# Patient Record
Sex: MALE | Race: WHITE | Employment: FULL TIME | URBAN - METROPOLITAN AREA
[De-identification: names, ages, dates, MRNs, and addresses within clinical notes are randomized per-mention and may not be internally consistent; named-entity substitution may affect disease eponyms.]

---

## 2017-06-05 ENCOUNTER — TELEPHONE (OUTPATIENT)
Dept: INTERNAL MEDICINE | Facility: CLINIC | Age: 32
End: 2017-06-05

## 2017-06-05 NOTE — TELEPHONE ENCOUNTER
Panel Management Review      Patient has the following on his problem list:     Hypertension   Last three blood pressure readings:  BP Readings from Last 3 Encounters:   10/25/16 (!) 142/102   07/13/16 (!) 164/118   12/09/09 150/90     Blood pressure: FAILED    HTN Guidelines:  Age 18-59 BP range:  Less than 140/90  Age 60-85 with Diabetes:  Less than 140/90  Age 60-85 without Diabetes:  less than 150/90      Composite cancer screening  Chart review shows that this patient is due/due soon for the following None  Summary:    Patient is due/failing the following:   BP CHECK    Action needed:   Patient needs office visit for HTN.    Type of outreach:    Phone, left message for patient to call back.     Questions for provider review:    None                                                                                                                                    LEOBARDO FLORES CMA       Chart routed to Care Team .

## 2017-06-05 NOTE — LETTER
[facility]  [address], [address]  [place], [place]  [address]                                            TEL:[phone]  FAX:[phone]      Stephane Vergara  [address]  [facility] [address]      June 28, 2017    Dear Stephane,          At [facility], we care about your health and well-being. A review of your chart has indicated that you are due for a follow up visit regarding blood pressure. Please contact us at [phone] to schedule an appointment.     If you have already had one or all of the above screening tests at another facility, please call us to update your chart.        Sincerely,      Qian Ann C.N.P.

## 2017-06-12 DIAGNOSIS — I10 BENIGN ESSENTIAL HTN: ICD-10-CM

## 2017-06-13 RX ORDER — LISINOPRIL 20 MG/1
TABLET ORAL
Qty: 60 TABLET | Refills: 0 | Status: SHIPPED | OUTPATIENT
Start: 2017-06-13 | End: 2018-06-12

## 2017-06-13 NOTE — TELEPHONE ENCOUNTER
Lisinopril       Last Written Prescription Date: 10/25/16  Last Fill Quantity: 180, # refills: 1  Last Office Visit with G, P or [facility] prescribing provider: 10/25/16     Per 10/25 dict-Increase lisinopril to 40 mg, nurse only BP in 2-3 weeks.  Labs pending  F/u 3 months and prn    Potassium   Date Value Ref Range Status   10/25/2016 3.9 3.4 - 5.3 mmol/L Final     Creatinine   Date Value Ref Range Status   10/25/2016 0.80 0.66 - 1.25 mg/dL Final     BP Readings from Last 3 Encounters:   10/25/16 (!) 142/102   07/13/16 (!) 164/118   12/09/09 150/90

## 2018-06-04 ENCOUNTER — OFFICE VISIT (OUTPATIENT)
Dept: URGENT CARE | Facility: URGENT CARE | Age: 33
End: 2018-06-04
Payer: COMMERCIAL

## 2018-06-04 VITALS
TEMPERATURE: 99.3 F | HEART RATE: 82 BPM | DIASTOLIC BLOOD PRESSURE: 102 MMHG | OXYGEN SATURATION: 98 % | SYSTOLIC BLOOD PRESSURE: 169 MMHG

## 2018-06-04 DIAGNOSIS — I10 BENIGN ESSENTIAL HYPERTENSION: ICD-10-CM

## 2018-06-04 DIAGNOSIS — M65.4 RADIAL STYLOID TENOSYNOVITIS: Primary | ICD-10-CM

## 2018-06-04 PROCEDURE — 99213 OFFICE O/P EST LOW 20 MIN: CPT | Performed by: PHYSICIAN ASSISTANT

## 2018-06-04 RX ORDER — LISINOPRIL 40 MG/1
40 TABLET ORAL DAILY
Qty: 30 TABLET | Refills: 1 | Status: SHIPPED | OUTPATIENT
Start: 2018-06-04 | End: 2018-10-23

## 2018-06-04 RX ORDER — PREDNISONE 20 MG/1
40 TABLET ORAL DAILY
Qty: 10 TABLET | Refills: 0 | Status: SHIPPED | OUTPATIENT
Start: 2018-06-04 | End: 2018-06-09

## 2018-06-04 NOTE — MR AVS SNAPSHOT
After Visit Summary   6/4/2018    Stephane Vergara    MRN: [id number]           Patient Information     Date Of Birth          1985        Visit Information        Provider Department      6/4/2018 7:15 PM Harriet Foreman PA-C [facility] URG[facility]        Today's Diagnoses     Benign essential hypertension    -  1    Radial styloid tenosynovitis          Care Instructions      De Quervain Tenosynovitis    De Quervain tenosynovitis is inflammation of tendons and synovium on the thumb side of the wrist. Tendons are fibers that attach muscle to bone. Synovium is a slick membrane that helps tendons move. Movements done over and over can irritate and inflame these tissues. This can cause pain when you touch or grasp objects, turn or twist your wrist, or make a fist. You may also have pain and swelling near the base of the thumb or numbness along the back of your thumb and index finger. You may also feel the thumb catch or snap when you move it.  Treatment will depend on how bad the pain is. It can often be treated with medicines, injections, splinting, and home care. If your case is severe, you may be referred to a specialist to talk about surgery.  Home care  Your healthcare provider may prescribe medicines to relieve pain and reduce inflammation. A steroid medicine may be injected near the tendons. This reduces swelling. The healthcare provider may also suggest taking over-the-counter medicines like ibuprofen or naproxen. These help reduce inflammation. Take all medicines only as directed.  The following are general care guidelines:    Avoid repetitive movements of your wrist and thumb.    Note any activity that causes pain or swelling. If possible, avoid or limit that activity.    Put a cold pack on your thumb. You can make your own cold pack by wrapping a plastic bag of ice or bag of frozen vegetables in a thin towel. Hold this to your thumb for up to 20 minutes at a time. Don't put  ice directly on the skin.    Your healthcare provider may put a splint on the thumb to hold it still. Use the splint as you have been instructed. In some cases, you may need to use a splint 24 hours a day for 4 to 6 weeks. This will allow the wrist and thumb to heal.  Follow-up care  Follow up with your healthcare provider, or as advised. You may need more treatment if your injury is severe or if your symptoms don't get better. This additional treatment may include local injections, physical therapy, and surgery.  When to seek medical advice  Call your healthcare provider right away if any of these occur:    Increase in pain or swelling    If you have fever, chills, redness, warmth, or drainage    Symptoms get worse after taking medicine    Pain spreads farther down the thumb or into the forearm    Pain continues to get in the way of daily life  Date Last Reviewed: 1/18/2016 2000-2017 [employer]. [address] [place] [address]. All rights reserved. This information is not intended as a substitute for professional medical care. Always follow your healthcare professional's instructions.                Follow-ups after your visit        Your next 10 appointments already scheduled     Jun 12, 2018  5:00 PM CDT   Office Visit with JEAN MARIE Garcia CNP   [facility] ([facility]    [address] [place] [address], [address]  [facility] [address] [phone]           Bring a current list of meds and any records pertaining to this visit. For Physicals, please bring immunization records and any forms needing to be filled out. Please arrive 10 minutes early to complete paperwork.              Who to contact     If you have questions or need follow up information about today's clinic visit or your schedule please contact [facility] directly at [phone].  Normal or non-critical lab and imaging results will be communicated to you by  MyChart, letter or phone within 4 business days after the clinic has received the results. If you do not hear from us within 7 days, please contact the clinic through MyChart or phone. If you have a critical or abnormal lab result, we will notify you by phone as soon as possible.  Submit refill requests through [employer] or call your pharmacy and they will forward the refill request to us. Please allow 3 business days for your refill to be completed.          Additional Information About Your Visit        Care EveryWhere ID     This is your Care EveryWhere ID. This could be used by other organizations to access your [place] medical records  [id number]        Your Vitals Were     Pulse Temperature Pulse Oximetry             82 99.3  F (37.4  C) (Oral) 98%          Blood Pressure from Last 3 Encounters:   06/04/18 (!) 169/102   10/25/16 (!) 142/102   07/13/16 (!) 164/118    Weight from Last 3 Encounters:   10/25/16 198 lb 8 oz (90 kg)   07/13/16 203 lb 11.2 oz (92.4 kg)   12/09/09 216 lb (98 kg)              Today, you had the following     No orders found for display         Today's Medication Changes          These changes are accurate as of 6/4/18  8:18 PM.  If you have any questions, ask your nurse or doctor.               Start taking these medicines.        Dose/Directions    predniSONE 20 MG tablet   Commonly known as:  DELTASONE   Used for:  Radial styloid tenosynovitis        Dose:  40 mg   Take 2 tablets (40 mg) by mouth daily for 5 days   Quantity:  10 tablet   Refills:  0         These medicines have changed or have updated prescriptions.        Dose/Directions    * lisinopril 20 MG tablet   Commonly known as:  PRINIVIL/ZESTRIL   This may have changed:  Another medication with the same name was added. Make sure you understand how and when to take each.   Used for:  Benign essential HTN        TAKE 2 TABLETS (40 MG) BY MOUTH DAILY   Quantity:  60 tablet   Refills:  0       * lisinopril 40 MG tablet    Commonly known as:  PRINIVIL/ZESTRIL   This may have changed:  You were already taking a medication with the same name, and this prescription was added. Make sure you understand how and when to take each.   Used for:  Benign essential hypertension        Dose:  40 mg   Take 1 tablet (40 mg) by mouth daily   Quantity:  30 tablet   Refills:  1       * Notice:  This list has 2 medication(s) that are the same as other medications prescribed for you. Read the directions carefully, and ask your doctor or other care provider to review them with you.         Where to get your medicines      These medications were sent to [facility]/pharmacy #[id number] - [place], [place] - [address]  [address], [facility] [address]     Phone:  [phone]     lisinopril 40 MG tablet    predniSONE 20 MG tablet                Primary Care Provider    Davi Tesfaye MD       No address on file        Equal Access to Services     [facility] : Hadii aad ku hadasho Sowinnie, waaxda luqadaha, qaybta kaalmada adeshunyapaula, stewart rojas . So [facility] [phone].    ATENCIÓN: Si habla español, tiene a burris disposición servicios gratuitos de asistencia lingüística. Ll[facility] [phone].    We comply with applicable federal civil rights laws and [place] laws. We do not discriminate on the basis of race, color, national origin, age, disability, sex, sexual orientation, or gender identity.            Thank you!     Thank you for choosing [facility]  for your care. Our goal is always to provide you with excellent care. Hearing back from our patients is one way we can continue to improve our services. Please take a few minutes to complete the written survey that you may receive in the mail after your visit with us. Thank you!             Your Updated Medication List - Protect others around you: Learn how to safely use, store and throw away your medicines at www.disposemymeds.org.          This list  is accurate as of 6/4/18  8:18 PM.  Always use your most recent med list.                   Brand Name Dispense Instructions for use Diagnosis    * lisinopril 20 MG tablet    PRINIVIL/ZESTRIL    60 tablet    TAKE 2 TABLETS (40 MG) BY MOUTH DAILY    Benign essential HTN       * lisinopril 40 MG tablet    PRINIVIL/ZESTRIL    30 tablet    Take 1 tablet (40 mg) by mouth daily    Benign essential hypertension       predniSONE 20 MG tablet    DELTASONE    10 tablet    Take 2 tablets (40 mg) by mouth daily for 5 days    Radial styloid tenosynovitis       * Notice:  This list has 2 medication(s) that are the same as other medications prescribed for you. Read the directions carefully, and ask your doctor or other care provider to review them with you.

## 2018-06-05 ASSESSMENT — ENCOUNTER SYMPTOMS
LIGHT-HEADEDNESS: 0
HEADACHES: 0
DIZZINESS: 0
CHEST TIGHTNESS: 0
WEAKNESS: 0

## 2018-06-05 NOTE — PATIENT INSTRUCTIONS
De Quervain Tenosynovitis    De Quervain tenosynovitis is inflammation of tendons and synovium on the thumb side of the wrist. Tendons are fibers that attach muscle to bone. Synovium is a slick membrane that helps tendons move. Movements done over and over can irritate and inflame these tissues. This can cause pain when you touch or grasp objects, turn or twist your wrist, or make a fist. You may also have pain and swelling near the base of the thumb or numbness along the back of your thumb and index finger. You may also feel the thumb catch or snap when you move it.  Treatment will depend on how bad the pain is. It can often be treated with medicines, injections, splinting, and home care. If your case is severe, you may be referred to a specialist to talk about surgery.  Home care  Your healthcare provider may prescribe medicines to relieve pain and reduce inflammation. A steroid medicine may be injected near the tendons. This reduces swelling. The healthcare provider may also suggest taking over-the-counter medicines like ibuprofen or naproxen. These help reduce inflammation. Take all medicines only as directed.  The following are general care guidelines:    Avoid repetitive movements of your wrist and thumb.    Note any activity that causes pain or swelling. If possible, avoid or limit that activity.    Put a cold pack on your thumb. You can make your own cold pack by wrapping a plastic bag of ice or bag of frozen vegetables in a thin towel. Hold this to your thumb for up to 20 minutes at a time. Don't put ice directly on the skin.    Your healthcare provider may put a splint on the thumb to hold it still. Use the splint as you have been instructed. In some cases, you may need to use a splint 24 hours a day for 4 to 6 weeks. This will allow the wrist and thumb to heal.  Follow-up care  Follow up with your healthcare provider, or as advised. You may need more treatment if your injury is severe or if your  symptoms don't get better. This additional treatment may include local injections, physical therapy, and surgery.  When to seek medical advice  Call your healthcare provider right away if any of these occur:    Increase in pain or swelling    If you have fever, chills, redness, warmth, or drainage    Symptoms get worse after taking medicine    Pain spreads farther down the thumb or into the forearm    Pain continues to get in the way of daily life  Date Last Reviewed: 1/18/2016 2000-2017 [employer]. [address] [place] [address]. All rights reserved. This information is not intended as a substitute for professional medical care. Always follow your healthcare professional's instructions.

## 2018-06-05 NOTE — PROGRESS NOTES
SUBJECTIVE:   Stephane Vergara is a 33 year old male presenting with a chief complaint of right wrist pain  Chief Complaint   Patient presents with     Urgent Care     Musculoskeletal Problem     R wrist pain started today- painful to move wrist, no injury. Hx of gout       He is an established patient of [facility].    MS Pain  Onset of symptoms was 1 day(s) ago.  Location: right wrist  Context:       No injury/ No trauma      Mechanism: pain started insidiously and has been worsening throughout the day. He does a lot of repetitive wrist motion at work  Course of symptoms is worsening.    Severity moderate  Current and Associated symptoms: Pain  Denies  Bruising and numbness or tingling  Aggravating Factors: moving the wrist  Therapies to improve symptoms include: ibuprofen  This is the first time this type of problem has occurred for this patient. Reports history of gout in toes.    Patient also reports he has run out of his BP medication a week ago. He takes 40 mg lisinopril daily. Notes he has an appointment with his PCP coming up next week.    Review of Systems   Eyes: Negative for visual disturbance.   Respiratory: Negative for chest tightness.    Cardiovascular: Negative for chest pain and leg swelling.   Musculoskeletal:        Right wrist pain   Neurological: Negative for dizziness, weakness, light-headedness and headaches.       Past Medical History:   Diagnosis Date     Benign essential HTN      ETOH abuse      Family History   Problem Relation Age of Onset     DIABETES No family hx of      HEART DISEASE No family hx of      CANCER Paternal Grandfather      liver     Hypertension Mother      Dementia Paternal Grandmother      Current Outpatient Prescriptions   Medication Sig Dispense Refill     lisinopril (PRINIVIL/ZESTRIL) 20 MG tablet TAKE 2 TABLETS (40 MG) BY MOUTH DAILY (Patient not taking: Reported on 6/4/2018) 60 tablet 0     lisinopril (PRINIVIL/ZESTRIL) 40 MG tablet Take 1 tablet (40 mg) by mouth  daily 30 tablet 1     order for DME Equipment being ordered: thumb spica splint 1 Device 0     predniSONE (DELTASONE) 20 MG tablet Take 2 tablets (40 mg) by mouth daily for 5 days 10 tablet 0     Social History   Substance Use Topics     Smoking status: Current Every Day Smoker     Types: Other     Smokeless tobacco: Former User     Types: Chew     Quit date: 3/21/2007      Comment: early january quit, 2005     Alcohol use 0.0 oz/week     0 Standard drinks or equivalent per week      Comment: 1.75 vodlka every 3 days.       OBJECTIVE  BP (!) 169/102 (BP Location: Right arm, Patient Position: Chair, Cuff Size: Adult Regular)  Pulse 82  Temp 99.3  F (37.4  C) (Oral)  SpO2 98%    Physical Exam   General appearance: 33 yr old male in no acute distress  Head: atraumatic  Eye: conjunctiva normal  Right wrist exam: no gross deformities, swelling or ecchymosis noted, tender over radial brachialis tendon, positive finkelsteins maneuver. No erythema, no wound. ROM mildly limited due to pain.  Lungs clear bilaterally  Chest with normal heart tones S1 and S2    Labs:  No results found for this or any previous visit (from the past 24 hour(s)).        ASSESSMENT:      ICD-10-CM    1. Radial styloid tenosynovitis M65.4 predniSONE (DELTASONE) 20 MG tablet     order for DME   2. Benign essential hypertension I10 lisinopril (PRINIVIL/ZESTRIL) 40 MG tablet        Medical Decision Making:    Differential Diagnosis:  Right wrist strain, tendonitis    Serious Comorbid Conditions:  Adult:  None    PLAN:  Right wrist tendonitis: prednisone is prescribed. Provided with a thumb spica brace prior to departure. Rest, ice, antiinflammatories prn. Follow up if worsening symptoms. He agrees with the plan.  Hypertension: one refill for lisinopril provided. Strongly encouraged follow up with PCP for recheck. Patient agrees.    Followup:    If not improving or if condition worsens, follow up with your Primary Care Provider    Patient Instructions      De Quervain Tenosynovitis    De Quervain tenosynovitis is inflammation of tendons and synovium on the thumb side of the wrist. Tendons are fibers that attach muscle to bone. Synovium is a slick membrane that helps tendons move. Movements done over and over can irritate and inflame these tissues. This can cause pain when you touch or grasp objects, turn or twist your wrist, or make a fist. You may also have pain and swelling near the base of the thumb or numbness along the back of your thumb and index finger. You may also feel the thumb catch or snap when you move it.  Treatment will depend on how bad the pain is. It can often be treated with medicines, injections, splinting, and home care. If your case is severe, you may be referred to a specialist to talk about surgery.  Home care  Your healthcare provider may prescribe medicines to relieve pain and reduce inflammation. A steroid medicine may be injected near the tendons. This reduces swelling. The healthcare provider may also suggest taking over-the-counter medicines like ibuprofen or naproxen. These help reduce inflammation. Take all medicines only as directed.  The following are general care guidelines:    Avoid repetitive movements of your wrist and thumb.    Note any activity that causes pain or swelling. If possible, avoid or limit that activity.    Put a cold pack on your thumb. You can make your own cold pack by wrapping a plastic bag of ice or bag of frozen vegetables in a thin towel. Hold this to your thumb for up to 20 minutes at a time. Don't put ice directly on the skin.    Your healthcare provider may put a splint on the thumb to hold it still. Use the splint as you have been instructed. In some cases, you may need to use a splint 24 hours a day for 4 to 6 weeks. This will allow the wrist and thumb to heal.  Follow-up care  Follow up with your healthcare provider, or as advised. You may need more treatment if your injury is severe or if your  symptoms don't get better. This additional treatment may include local injections, physical therapy, and surgery.  When to seek medical advice  Call your healthcare provider right away if any of these occur:    Increase in pain or swelling    If you have fever, chills, redness, warmth, or drainage    Symptoms get worse after taking medicine    Pain spreads farther down the thumb or into the forearm    Pain continues to get in the way of daily life  Date Last Reviewed: 1/18/2016 2000-2017 [employer]. [address] [place] [address]. All rights reserved. This information is not intended as a substitute for professional medical care. Always follow your healthcare professional's instructions.

## 2018-06-12 ENCOUNTER — OFFICE VISIT (OUTPATIENT)
Dept: FAMILY MEDICINE | Facility: CLINIC | Age: 33
End: 2018-06-12
Payer: COMMERCIAL

## 2018-06-12 VITALS
HEART RATE: 94 BPM | WEIGHT: 196 LBS | BODY MASS INDEX: 29.03 KG/M2 | RESPIRATION RATE: 12 BRPM | HEIGHT: 69 IN | DIASTOLIC BLOOD PRESSURE: 100 MMHG | TEMPERATURE: 98.8 F | SYSTOLIC BLOOD PRESSURE: 148 MMHG

## 2018-06-12 DIAGNOSIS — I10 BENIGN ESSENTIAL HYPERTENSION: ICD-10-CM

## 2018-06-12 PROCEDURE — 99213 OFFICE O/P EST LOW 20 MIN: CPT | Performed by: NURSE PRACTITIONER

## 2018-06-12 RX ORDER — LISINOPRIL 40 MG/1
40 TABLET ORAL DAILY
Qty: 30 TABLET | Refills: 1 | Status: CANCELLED | OUTPATIENT
Start: 2018-06-12

## 2018-06-12 NOTE — MR AVS SNAPSHOT
"              After Visit Summary   6/12/2018    Stephane Vergara    MRN: [id number]           Patient Information     Date Of Birth          1985        Visit Information        Provider Department      6/12/2018 5:00 PM Nery Lyon APRN CNP [facility]        Today's Diagnoses     Benign essential hypertension          Care Instructions    Have blood pressure rechecked at a [facility] in 2 weeks.  Follow up in the clinic in 4 weeks.  Come to appointment fasting (nothing to eat/drink for 8-10 hours) for lab work.            Follow-ups after your visit        Follow-up notes from your care team     Return in about 4 weeks (around 7/10/2018) for blood pressure .      Who to contact     If you have questions or need follow up information about today's clinic visit or your schedule please contact [facility] directly at [phone].  Normal or non-critical lab and imaging results will be communicated to you by MyChart, letter or phone within 4 business days after the clinic has received the results. If you do not hear from us within 7 days, please contact the clinic through MyChart or phone. If you have a critical or abnormal lab result, we will notify you by phone as soon as possible.  Submit refill requests through [employer] or call your pharmacy and they will forward the refill request to us. Please allow 3 business days for your refill to be completed.          Additional Information About Your Visit        Care EveryWhere ID     This is your Care EveryWhere ID. This could be used by other organizations to access your [facility] medical records  [id number]        Your Vitals Were     Pulse Temperature Respirations Height BMI (Body Mass Index)       94 98.8  F (37.1  C) (Oral) 12 5' 9\" (1.753 m) 28.94 kg/m2        Blood Pressure from Last 3 Encounters:   06/12/18 (!) 148/100   06/04/18 (!) 169/102   10/25/16 (!) 142/102    Weight from Last 3 Encounters:   06/12/18 " 196 lb (88.9 kg)   10/25/16 198 lb 8 oz (90 kg)   07/13/16 203 lb 11.2 oz (92.4 kg)              Today, you had the following     No orders found for display       Primary Care Provider    Davi Tesfaye MD       No address on file        Equal Access to Services     [facility] : Hadii aad ku hadasho Soomaali, waaxda luqadaha, qaybta kaalmada adeegyada, waxay dorothyin haychristinen faye meronsathish rojas . So [facility] [phone].    ATENCIÓN: Si habla español, tiene a burris disposición servicios gratuitos de asistencia lingüística. Llame al [phone].    We comply with applicable federal civil rights laws and [place] laws. We do not discriminate on the basis of race, color, national origin, age, disability, sex, sexual orientation, or gender identity.            Thank you!     Thank you for choosing [facility]  for your care. Our goal is always to provide you with excellent care. Hearing back from our patients is one way we can continue to improve our services. Please take a few minutes to complete the written survey that you may receive in the mail after your visit with us. Thank you!             Your Updated Medication List - Protect others around you: Learn how to safely use, store and throw away your medicines at www.disposemymeds.org.          This list is accurate as of 6/12/18  5:15 PM.  Always use your most recent med list.                   Brand Name Dispense Instructions for use Diagnosis    lisinopril 40 MG tablet    PRINIVIL/ZESTRIL    30 tablet    Take 1 tablet (40 mg) by mouth daily    Benign essential hypertension

## 2018-06-12 NOTE — PATIENT INSTRUCTIONS
Have blood pressure rechecked at a [facility] in 2 weeks.  Follow up in the clinic in 4 weeks.  Come to appointment fasting (nothing to eat/drink for 8-10 hours) for lab work.

## 2018-06-12 NOTE — PROGRESS NOTES
SUBJECTIVE:   Stephane Vergara is a 33 year old male who presents to clinic today for the following health issues:      Hypertension     Hypertension:     Outpatient blood pressures:  Are not being checked    Dietary sodium intake::  Not monitoring salt intake    Diet:  Regular (no restrictions)  Taking medications regularly:  Yes  Medication side effects:  None  Additional concerns today:  No  History of Present Illness     Hypertension:     Outpatient blood pressures:  Are not being checked    Dietary sodium intake::  Not monitoring salt intake    Diet:  Regular (no restrictions)  Taking medications regularly:  Yes  Medication side effects:  None  Additional concerns today:  No    Has been on lisinopril for over a year.  Doesn't have a PCP.  Last refilled at .  Has been taking daily.  Work is physically demanding.  Lifts weights 2-3x/week.  Reports he feels anxious when he is at doctor appointments.  He does not check his BP at home, but his wife did recently buy him a cuff to use at home. He is a smoker; uses nicotine in an E-cig.  Not interested in quitting.     BP Readings from Last 6 Encounters:   06/12/18 (!) 148/100   06/04/18 (!) 169/102   10/25/16 (!) 142/102   07/13/16 (!) 164/118   12/09/09 150/90   05/21/07 142/100     Problem list and histories reviewed & adjusted, as indicated.  Additional history: as documented        Current Outpatient Prescriptions   Medication Sig Dispense Refill     lisinopril (PRINIVIL/ZESTRIL) 40 MG tablet Take 1 tablet (40 mg) by mouth daily 30 tablet 1     [DISCONTINUED] lisinopril (PRINIVIL/ZESTRIL) 20 MG tablet TAKE 2 TABLETS (40 MG) BY MOUTH DAILY (Patient not taking: Reported on 6/4/2018) 60 tablet 0     Allergies   Allergen Reactions     No Known Drug Allergies        ROS:  Constitutional, HEENT, cardiovascular, pulmonary, gi and gu systems are negative, except as otherwise noted.    OBJECTIVE:     BP (!) 148/100 (BP Location: Right arm, Patient Position: Sitting,  "Cuff Size: Adult Regular)  Pulse 94  Temp 98.8  F (37.1  C) (Oral)  Resp 12  Ht 5' 9\" (1.753 m)  Wt 196 lb (88.9 kg)  BMI 28.94 kg/m2  Body mass index is 28.94 kg/(m^2).  GENERAL: healthy, alert and no distress  EYES: Eyes grossly normal to inspection, PERRL and conjunctivae and sclerae normal  HENT: ear canals and TM's normal, nose and mouth without ulcers or lesions  NECK: no adenopathy, no asymmetry, masses, or scars and thyroid normal to palpation  RESP: lungs clear to auscultation - no rales, rhonchi or wheezes  CV: regular rate and rhythm, normal S1 S2, no S3 or S4, no murmur, click or rub  PSYCH: mentation appears normal, affect normal/bright    Diagnostic Test Results:  none     ASSESSMENT/PLAN:   1. Benign essential hypertension  Elevated today.  I would like him to have his BP rechecked in 2 weeks at a  pharmacy.  Can start checking BP at home as well.  If continuing to see elevated readings will need to consider adding additional medication.  Suspect white coat syndrome may be playing a role.  He has lisinopril at home and still has 1 refill.  Bring home cuff to next appointment.  Will do labs at next appointment; discussed importance of coming to appointment fasting.      F/u 1 month    JEAN MARIE Parrish [facility]  "

## 2018-10-11 ENCOUNTER — TELEPHONE (OUTPATIENT)
Dept: FAMILY MEDICINE | Facility: CLINIC | Age: 33
End: 2018-10-11

## 2018-10-11 NOTE — TELEPHONE ENCOUNTER
Panel Management Review      Patient has the following on his problem list:     Hypertension   Last three blood pressure readings:  BP Readings from Last 3 Encounters:   06/12/18 (!) 148/100   06/04/18 (!) 169/102   10/25/16 (!) 142/102     Blood pressure: FAILED    HTN Guidelines:  Age 18-59 BP range:  Less than 140/90  Age 60-85 with Diabetes:  Less than 140/90  Age 60-85 without Diabetes:  less than 150/90      Composite cancer screening  Chart review shows that this patient is due/due soon for the following None  Summary:    Patient is due/failing the following:   BP CHECK and FOLLOW UP    Action needed:   Patient needs office visit for hypertension follow up, blood pressure recheck.    Type of outreach:    Phone, spoke to patient.  scheduled appointment for 10/23/18 with Nery Lyon CNP     Questions for provider review:    None                                                                                                                                    Griselda Larson MA       Encounter closed

## 2018-10-23 ENCOUNTER — OFFICE VISIT (OUTPATIENT)
Dept: FAMILY MEDICINE | Facility: CLINIC | Age: 33
End: 2018-10-23
Payer: COMMERCIAL

## 2018-10-23 VITALS
RESPIRATION RATE: 16 BRPM | TEMPERATURE: 98.4 F | SYSTOLIC BLOOD PRESSURE: 164 MMHG | HEART RATE: 92 BPM | DIASTOLIC BLOOD PRESSURE: 116 MMHG | HEIGHT: 69 IN | OXYGEN SATURATION: 98 % | WEIGHT: 193.9 LBS | BODY MASS INDEX: 28.72 KG/M2

## 2018-10-23 DIAGNOSIS — R06.83 SNORING: Primary | ICD-10-CM

## 2018-10-23 DIAGNOSIS — Z13.6 CARDIOVASCULAR SCREENING; LDL GOAL LESS THAN 130: ICD-10-CM

## 2018-10-23 DIAGNOSIS — I10 BENIGN ESSENTIAL HYPERTENSION: ICD-10-CM

## 2018-10-23 DIAGNOSIS — R40.0 HAS DAYTIME DROWSINESS: ICD-10-CM

## 2018-10-23 PROCEDURE — 80048 BASIC METABOLIC PNL TOTAL CA: CPT | Performed by: NURSE PRACTITIONER

## 2018-10-23 PROCEDURE — 80061 LIPID PANEL: CPT | Performed by: NURSE PRACTITIONER

## 2018-10-23 PROCEDURE — 36415 COLL VENOUS BLD VENIPUNCTURE: CPT | Performed by: NURSE PRACTITIONER

## 2018-10-23 PROCEDURE — 99214 OFFICE O/P EST MOD 30 MIN: CPT | Performed by: NURSE PRACTITIONER

## 2018-10-23 RX ORDER — LISINOPRIL 40 MG/1
40 TABLET ORAL DAILY
Qty: 30 TABLET | Refills: 0 | Status: SHIPPED | OUTPATIENT
Start: 2018-10-23 | End: 2018-12-12

## 2018-10-23 NOTE — MR AVS SNAPSHOT
After Visit Summary   10/23/2018    Stephane Vergara    MRN: [id number]           Patient Information     Date Of Birth          1985        Visit Information        Provider Department      10/23/2018 4:40 PM Nery Lyon APRN CNP [facility]        Today's Diagnoses     Snoring    -  1    Benign essential hypertension        Has daytime drowsiness        CARDIOVASCULAR SCREENING; LDL GOAL LESS THAN 130          Care Instructions    Recheck BP in 2 weeks.            Follow-ups after your visit        Additional Services     SLEEP EVALUATION & MANAGEMENT REFERRAL - [facility]  [phone] (Age 18 and up)       Please be aware that coverage of these services is subject to the terms and limitations of your health insurance plan.  Call member services at your health plan with any benefit or coverage questions.      Please bring the following to your appointment:    >>   List of current medications   >>   This referral request   >>   Any documents/labs given to you for this referral                      Follow-up notes from your care team     Return in about 2 weeks (around 11/6/2018) for BP recheck .      Future tests that were ordered for you today     Open Future Orders        Priority Expected Expires Ordered    SLEEP EVALUATION & MANAGEMENT REFERRAL - [facility]  [phone] (Age 18 and up) Routine  10/23/2019 10/23/2018            Who to contact     If you have questions or need follow up information about today's clinic visit or your schedule please contact [facility] directly at [phone].  Normal or non-critical lab and imaging results will be communicated to you by MyChart, letter or phone within 4 business days after the clinic has received the results. If you do not hear from us within 7 days, please contact the clinic through MyChart or phone. If you have a critical or abnormal  "lab result, we will notify you by phone as soon as possible.  Submit refill requests through [employer] or call your pharmacy and they will forward the refill request to us. Please allow 3 business days for your refill to be completed.          Additional Information About Your Visit        MyChart Information     [employer] lets you send messages to your doctor, view your test results, renew your prescriptions, schedule appointments and more. To sign up, go to [email or web address] . Click on \"Log in\" on the left side of the screen, which will take you to the Welcome page. Then click on \"Sign up Now\" on the right side of the page.     You will be asked to enter the access code listed below, as well as some personal information. Please follow the directions to create your username and password.     Your access code is: [email or web address]  Expires: 2019  5:17 PM     Your access code will  in 90 days. If you need help or a new code, please call your [facility] clinic or [phone].        Care EveryWhere ID     This is your Care EveryWhere ID. This could be used by other organizations to access your [facility] medical records  [id number]        Your Vitals Were     Pulse Temperature Respirations Height Pulse Oximetry BMI (Body Mass Index)    92 98.4  F (36.9  C) (Oral) 16 5' 9\" (1.753 m) 98% 28.63 kg/m2       Blood Pressure from Last 3 Encounters:   10/23/18 (!) 164/116   18 (!) 148/100   18 (!) 169/102    Weight from Last 3 Encounters:   10/23/18 193 lb 14.4 oz (88 kg)   18 196 lb (88.9 kg)   10/25/16 198 lb 8 oz (90 kg)              We Performed the Following     Basic metabolic panel     Lipid panel reflex to direct LDL Non-fasting          Where to get your medicines      These medications were sent to [id number] - [place] - [address]    [place], [facility] [address]     Phone:  [phone]     lisinopril 40 MG tablet          Primary Care Provider    Davi SINGLETON" MD [place]       No address on file        Equal Access to Services     NURY COLLADO : Hadii sandra Marcos, sridevi franco, stewart ortega. So [facility] [phone].    ATENCIÓN: Si habla español, tiene a burris disposición servicios gratuitos de asistencia lingüística. Llame al [phone].    We comply with applicable federal civil rights laws and [place] laws. We do not discriminate on the basis of race, color, national origin, age, disability, sex, sexual orientation, or gender identity.            Thank you!     Thank you for choosing [facility]  for your care. Our goal is always to provide you with excellent care. Hearing back from our patients is one way we can continue to improve our services. Please take a few minutes to complete the written survey that you may receive in the mail after your visit with us. Thank you!             Your Updated Medication List - Protect others around you: Learn how to safely use, store and throw away your medicines at www.disposemymeds.org.          This list is accurate as of 10/23/18  5:17 PM.  Always use your most recent med list.                   Brand Name Dispense Instructions for use Diagnosis    lisinopril 40 MG tablet    PRINIVIL/ZESTRIL    30 tablet    Take 1 tablet (40 mg) by mouth daily    Benign essential hypertension

## 2018-10-23 NOTE — PROGRESS NOTES
"  SUBJECTIVE:   Stephane Vergara is a 33 year old male who presents to clinic today for the following health issues:    Hypertension Follow-up      Outpatient blood pressures are not being checked.    Low Salt Diet: Tries to stay away from salt.       Amount of exercise or physical activity: Just started lifting weights again.     Problems taking medications regularly: No    Medication side effects: none    Diet: regular (no restrictions)    Pt states regarding his hypertension that it feels as if his face feels hot, typically around the forehead.  Notices this when he gets tense.  He does note that he snores loudly and always feels tired, even after a full night of sleeping.  He has started to get back to lifting weights.  Work is physically demanding.  In the past drank a \"too much\".  Has cut back to 1 or 2 glasses of wine.  Reports he feels he could cut back more.  Doesn't feel he needs to drink.  Has been out of his medications for the past 1 week.          Problem list and histories reviewed & adjusted, as indicated.  Additional history: as documented    Current Outpatient Prescriptions   Medication Sig Dispense Refill     lisinopril (PRINIVIL/ZESTRIL) 40 MG tablet Take 1 tablet (40 mg) by mouth daily 30 tablet 1     Allergies   Allergen Reactions     No Known Drug Allergies        Reviewed and updated as needed this visit by clinical staff  Tobacco  Allergies  Meds  Med Hx  Surg Hx  Fam Hx  Soc Hx      Reviewed and updated as needed this visit by Provider         ROS:  Constitutional, HEENT, cardiovascular, pulmonary, gi and gu systems are negative, except as otherwise noted.    OBJECTIVE:     BP (!) 164/116 (BP Location: Right arm, Patient Position: Chair, Cuff Size: Adult Large)  Pulse 92  Temp 98.4  F (36.9  C) (Oral)  Resp 16  Ht 5' 9\" (1.753 m)  Wt 193 lb 14.4 oz (88 kg)  SpO2 98%  BMI 28.63 kg/m2  Body mass index is 28.63 kg/(m^2).  GENERAL: healthy, alert and no distress  NECK: no " adenopathy, no asymmetry, masses, or scars and thyroid normal to palpation  RESP: lungs clear to auscultation - no rales, rhonchi or wheezes  CV: regular rate and rhythm, normal S1 S2, no S3 or S4, no murmur, click or rub, no peripheral edema and peripheral pulses strong  PSYCH: mentation appears normal, affect normal/bright    Diagnostic Test Results:  none     ASSESSMENT/PLAN:   1. Benign essential hypertension  Out of medication for the past week.  BP recheck in 2 weeks. If controlled will send in additional refills and if still high will need to add an additional agent.    - lisinopril (PRINIVIL/ZESTRIL) 40 MG tablet; Take 1 tablet (40 mg) by mouth daily  Dispense: 30 tablet; Refill: 0  - Basic metabolic panel    2. Snoring  May be effecting BP.   - SLEEP EVALUATION & MANAGEMENT REFERRAL - [facility]  [phone] (Age 18 and up); Future    3. Has daytime drowsiness  - SLEEP EVALUATION & MANAGEMENT REFERRAL - [facility]  [phone] (Age 18 and up); Future    4. CARDIOVASCULAR SCREENING; LDL GOAL LESS THAN 130  - Lipid panel reflex to direct LDL Non-fasting    F/u 2 weeks for BP recheck    JEAN MARIE Parrish [facility]

## 2018-10-25 LAB
ANION GAP SERPL CALCULATED.3IONS-SCNC: 11 MMOL/L (ref 3–14)
BUN SERPL-MCNC: 15 MG/DL (ref 7–30)
CALCIUM SERPL-MCNC: 9.1 MG/DL (ref 8.5–10.1)
CHLORIDE SERPL-SCNC: 102 MMOL/L (ref 94–109)
CHOLEST SERPL-MCNC: 223 MG/DL
CO2 SERPL-SCNC: 23 MMOL/L (ref 20–32)
CREAT SERPL-MCNC: 0.88 MG/DL (ref 0.66–1.25)
GFR SERPL CREATININE-BSD FRML MDRD: >90 ML/MIN/1.7M2
GLUCOSE SERPL-MCNC: 81 MG/DL (ref 70–99)
HDLC SERPL-MCNC: 69 MG/DL
LDLC SERPL CALC-MCNC: 96 MG/DL
NONHDLC SERPL-MCNC: 154 MG/DL
POTASSIUM SERPL-SCNC: 4.2 MMOL/L (ref 3.4–5.3)
SODIUM SERPL-SCNC: 136 MMOL/L (ref 133–144)
TRIGL SERPL-MCNC: 292 MG/DL

## 2019-01-11 ENCOUNTER — TELEPHONE (OUTPATIENT)
Dept: FAMILY MEDICINE | Facility: CLINIC | Age: 34
End: 2019-01-11

## 2019-01-11 NOTE — LETTER
[address], [address]  [facility] [address] [phone]  February 7, 2019    Stephane Vergara  [address]  [facility] [address]      Dear Stephane,    I care about your health and have reviewed your health plan.  I have reviewed your medical conditions, medication list, and lab results and am making recommendations  based on this review, to better manage your health.    You are particularly in need of attention regarding:  -High Blood Pressure    I am recommending that you:  -schedule a FOLLOWUP OFFICE APPOINTMENT with me.       Here is a list of Health Maintenance topics that are due now or due soon:  Health Maintenance Due   Topic Date Due     HIV SCREEN (SYSTEM ASSIGNED)  01/07/2002     INFLUENZA VACCINE (1) 09/01/2018       Please call us at [phone] (or use [employer]) to address the above   recommendations.    Thank you for trusting [facility] and we appreciate the opportunity to serve you.  We look forward to supporting your healthcare needs in the future.    Healthy Regards,    JEAN MARIE Vegas CNP/[facility]

## 2019-01-11 NOTE — TELEPHONE ENCOUNTER
Panel Management Review      Patient has the following on his problem list:     Hypertension   Last three blood pressure readings:  BP Readings from Last 3 Encounters:   10/23/18 (!) 164/116   06/12/18 (!) 148/100   06/04/18 (!) 169/102     Blood pressure: FAILED    HTN Guidelines:  Age 18-59 BP range:  Less than 140/90  Age 60-85 with Diabetes:  Less than 140/90  Age 60-85 without Diabetes:  less than 150/90      Composite cancer screening  Chart review shows that this patient is due/due soon for the following None  Summary:    Patient is due/failing the following:   FOLLOW UP    Action needed:   Patient needs office visit for BLOOD PRESSURE.    Type of outreach:    Phone, left message for patient to call back.     Questions for provider review:    None                                                                                                                                    Lisa Magill, CMA       Chart routed to Care Team .

## 2019-03-05 ENCOUNTER — OFFICE VISIT (OUTPATIENT)
Dept: FAMILY MEDICINE | Facility: CLINIC | Age: 34
End: 2019-03-05
Payer: COMMERCIAL

## 2019-03-05 VITALS
BODY MASS INDEX: 28.88 KG/M2 | TEMPERATURE: 98.6 F | HEART RATE: 77 BPM | WEIGHT: 195 LBS | OXYGEN SATURATION: 97 % | SYSTOLIC BLOOD PRESSURE: 162 MMHG | HEIGHT: 69 IN | DIASTOLIC BLOOD PRESSURE: 108 MMHG

## 2019-03-05 DIAGNOSIS — I10 BENIGN ESSENTIAL HYPERTENSION: ICD-10-CM

## 2019-03-05 PROCEDURE — 99214 OFFICE O/P EST MOD 30 MIN: CPT | Performed by: NURSE PRACTITIONER

## 2019-03-05 RX ORDER — LISINOPRIL AND HYDROCHLOROTHIAZIDE 12.5; 2 MG/1; MG/1
2 TABLET ORAL DAILY
Qty: 60 TABLET | Refills: 0 | Status: CANCELLED | OUTPATIENT
Start: 2019-03-05

## 2019-03-05 RX ORDER — LISINOPRIL 40 MG/1
40 TABLET ORAL DAILY
Qty: 30 TABLET | Refills: 0 | Status: SHIPPED | OUTPATIENT
Start: 2019-03-05 | End: 2019-04-01

## 2019-03-05 RX ORDER — LISINOPRIL 40 MG/1
40 TABLET ORAL DAILY
Qty: 14 TABLET | Refills: 0 | Status: CANCELLED | OUTPATIENT
Start: 2019-03-05

## 2019-03-05 ASSESSMENT — MIFFLIN-ST. JEOR: SCORE: 1809.89

## 2019-03-05 NOTE — PROGRESS NOTES
"  SUBJECTIVE:   Stephane Vergara is a 34 year old male who presents to clinic today for the following health issues:      Hypertension Follow-up      Outpatient blood pressures are being checked at home.  Results are high.    Low Salt Diet: not monitoring salt    Amount of exercise or physical activity: 4-5 days/week for an average of greater than 60 minutes    Problems taking medications regularly: No    Medication side effects: none    Diet: regular (no restrictions)    Has been out of BP pills for about 2 weeks. Work is really physically demanding, walks 10-14 miles/day at work.  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  Does not add salt and tries to limit salty foods.  Has BP monitor at home but doesn't check it.  Has checked it since he has been out of meds and results are high.     BP Readings from Last 6 Encounters:   03/05/19 (!) 162/108   10/23/18 (!) 164/116   06/12/18 (!) 148/100   06/04/18 (!) 169/102   10/25/16 (!) 142/102   07/13/16 (!) 164/118         Problem list and histories reviewed & adjusted, as indicated.  Additional history: as documented    Current Outpatient Medications   Medication Sig Dispense Refill     lisinopril (PRINIVIL/ZESTRIL) 40 MG tablet TAKE 1 TABLET BY MOUTH EVERY DAY 14 tablet 0     Allergies   Allergen Reactions     No Known Drug Allergies        Reviewed and updated as needed this visit by clinical staff       Reviewed and updated as needed this visit by Provider         ROS:  Constitutional, HEENT, cardiovascular, pulmonary, gi and gu systems are negative, except as otherwise noted.    OBJECTIVE:     BP (!) 162/108 (BP Location: Right arm, Patient Position: Sitting, Cuff Size: Adult Large)   Pulse 77   Temp 98.6  F (37  C) (Oral)   Ht 1.753 m (5' 9\")   Wt 88.5 kg (195 lb)   SpO2 97%   BMI 28.80 kg/m    Body mass index is 28.8 kg/m .  GENERAL: healthy, alert and no distress  NECK: no adenopathy, no asymmetry, masses, or scars and thyroid normal to palpation  RESP: lungs clear " to auscultation - no rales, rhonchi or wheezes  CV: regular rate and rhythm, normal S1 S2, no S3 or S4, no murmur, click or rub, no peripheral edema and peripheral pulses strong  MS: no gross musculoskeletal defects noted, no edema  NEURO: AOx4  PSYCH: mentation appears normal, affect normal/bright    Diagnostic Test Results:  none     ASSESSMENT/PLAN:   1. Benign essential hypertension  Uncontrolled BP today due to being out of meds.  I suspect he may need an additional agent for optimal control, but would like to see what his BP is when he is consistently taking his lisinopril.  Stressed importance of taking medication and coming in for BP check in 2 weeks.  If controlled will give additional refills when he is in for BP recheck.   - lisinopril (PRINIVIL/ZESTRIL) 40 MG tablet; Take 1 tablet (40 mg) by mouth daily  Dispense: 30 tablet; Refill: 0        JEAN MARIE Parrish [facility]

## 2019-03-19 ENCOUNTER — OFFICE VISIT (OUTPATIENT)
Dept: NURSING | Facility: CLINIC | Age: 34
End: 2019-03-19
Payer: COMMERCIAL

## 2019-03-19 VITALS — SYSTOLIC BLOOD PRESSURE: 146 MMHG | HEART RATE: 96 BPM | DIASTOLIC BLOOD PRESSURE: 110 MMHG

## 2019-03-19 DIAGNOSIS — I10 BENIGN ESSENTIAL HTN: Primary | ICD-10-CM

## 2019-03-19 PROCEDURE — 99207 ZZC NO CHARGE NURSE ONLY: CPT

## 2019-03-19 RX ORDER — LISINOPRIL AND HYDROCHLOROTHIAZIDE 12.5; 2 MG/1; MG/1
2 TABLET ORAL DAILY
Qty: 60 TABLET | Refills: 0 | Status: SHIPPED | OUTPATIENT
Start: 2019-03-19 | End: 2019-04-12

## 2019-03-19 NOTE — NURSING NOTE
"Chief Complaint   Patient presents with     Allied Health Visit     B/P check     Initial BP (!) 146/110 (BP Location: Right arm, Patient Position: Sitting, Cuff Size: Adult Regular)   Pulse 96  Estimated body mass index is 28.8 kg/m  as calculated from the following:    Height as of 3/5/19: 1.753 m (5' 9\").    Weight as of 3/5/19: 88.5 kg (195 lb).  BP completed using cuff size regular right arm    Lisa Magill, CMA    "

## 2019-03-19 NOTE — PROGRESS NOTES
BP still high; will add in hydrochlorothiazide.  BP recheck in 2 weeks and f/u for OV in 4 wks.    Nery Lyon CNP

## 2019-03-19 NOTE — PROGRESS NOTES
Stephane Vergara is a 34 year old patient who comes in today for a Blood Pressure check.  Initial BP:  BP (!) 146/110 (BP Location: Right arm, Patient Position: Sitting, Cuff Size: Adult Regular)   Pulse 96        Disposition: provider notified while patient in the clinic and Patient informed that a new medication lisinopril-hydrochlorothiazide (PRINZIDE/ZESTORETIC) 20-12.5 MG tablet was ordered and to start taking that one on 03/20/19 and stop taking lisinopril (PRINIVIL/ZESTRIL) 40 MG tablet.    Assisted patient in scheduling another NURSE ONLY B/P check appointment for 04/02/19. Lisa Magill, CMA

## 2019-04-01 DIAGNOSIS — I10 BENIGN ESSENTIAL HYPERTENSION: ICD-10-CM

## 2019-04-01 NOTE — TELEPHONE ENCOUNTER
"Requested Prescriptions   Pending Prescriptions Disp Refills     lisinopril (PRINIVIL/ZESTRIL) 40 MG tablet [Pharmacy Med Name: LISINOPRIL 40 MG TABLET] 30 tablet 0    Last Written Prescription Date:  3/5/19  Last Fill Quantity: 30,  # refills: 0   Last Office Visit: 3/5/2019 Strong      Return in about 2 weeks (around 3/19/2019) for BP recheck .     Future Office Visit:    Next 5 appointments (look out 90 days)    Apr 02, 2019  3:00 PM CDT  Nurse Only with FM MA/LPN  [facility] [address] [place] [address], [address]  [facility] [address] [phone]          Sig: TAKE 1 TABLET BY MOUTH EVERY DAY    ACE Inhibitors (Including Combos) Protocol Failed - 4/1/2019  1:14 AM       Failed - Blood pressure under 140/90 in past 12 months    BP Readings from Last 3 Encounters:   03/19/19 (!) 146/110   03/05/19 (!) 162/108   10/23/18 (!) 164/116                Passed - Recent (12 mo) or future (30 days) visit within the authorizing provider's specialty    Patient had office visit in the last 12 months or has a visit in the next 30 days with authorizing provider or within the authorizing provider's specialty.  See \"Patient Info\" tab in inbasket, or \"Choose Columns\" in Meds & Orders section of the refill encounter.             Passed - Medication is active on med list       Passed - Patient is age 18 or older       Passed - Normal serum creatinine on file in past 12 months    Recent Labs   Lab Test 10/23/18  1717   CR 0.88            Passed - Normal serum potassium on file in past 12 months    Recent Labs   Lab Test 10/23/18  1717   POTASSIUM 4.2             "

## 2019-04-02 NOTE — TELEPHONE ENCOUNTER
Will leave refill pended for now. The Pt has a BP Nurse Only check today.     Maddi Alvarez RN -- [facility]

## 2019-04-03 NOTE — TELEPHONE ENCOUNTER
Called the Pt to schedule a Nurse Only BP check which he is now due for. LM, waiting callback. Please help him schedule.     Maddi Alvarez RN -- [facility]

## 2019-04-06 NOTE — TELEPHONE ENCOUNTER
Routing refill request to provider for review/approval because:  Labs out of range:  Bp    Pt has BP check scheduled for 4/10/19    Zachary Mercado RN, BSN

## 2019-04-08 RX ORDER — LISINOPRIL 40 MG/1
TABLET ORAL
Qty: 30 TABLET | Refills: 0 | Status: SHIPPED | OUTPATIENT
Start: 2019-04-08 | End: 2019-04-08

## 2019-04-08 NOTE — TELEPHONE ENCOUNTER
I accidentally signed this.  He should not be taking this any longer.  Was changed to Prinzide in March.  He should have plenty to get him to his BP recheck in 2 days.  Please call pharmacy and have lisinopril taken off auto-refill.    Nery Lyon CNP

## 2019-04-12 DIAGNOSIS — I10 BENIGN ESSENTIAL HTN: ICD-10-CM

## 2019-04-12 NOTE — LETTER
[address], [address]  [facility] [address]  Phone: [phone]  Fax: [phone]    04/16/19    Stephane Vergara  [address]  [facility] [address]      Dear Stephane:     We recently received a call from your pharmacy requesting a refill of your medication - lisinopril-hydrochlorothiazide (PRINZIDE/ZESTORETIC) 20-12.5 MG tablet .    A review of your chart indicates that an appointment is required with your provider for office visit for BP recheck. Please call the clinic at [phone] to schedule your appointment.    We have authorized one refill of your medication to allow time for you to schedule your appointment.    Taking care of your health is important to us, and ongoing visits with your provider are vital to your care.  We look forward to seeing you in the near future.          Sincerely,      JEAN MARIE Parrish CNP/Jasmin SINGLETON RN

## 2019-04-12 NOTE — TELEPHONE ENCOUNTER
"Requested Prescriptions   Pending Prescriptions Disp Refills     lisinopril-hydrochlorothiazide (PRINZIDE/ZESTORETIC) 20-12.5 MG tablet [Pharmacy Med Name: LISINOPRIL-HCTZ 20-12.5 MG TAB] 60 tablet 0     Sig: TAKE 2 TABLETS BY MOUTH EVERY DAY   Last Written Prescription Date:  3/19/19  Last Fill Quantity: 60,  # refills: 0   Last Office Visit: 3/5/2019 Strong      Return in about 2 weeks (around 3/19/2019) for BP recheck .     Future Office Visit:         Diuretics (Including Combos) Protocol Failed - 4/12/2019  7:33 AM        Failed - Blood pressure under 140/90 in past 12 months     BP Readings from Last 3 Encounters:   03/19/19 (!) 146/110   03/05/19 (!) 162/108   10/23/18 (!) 164/116                 Passed - Recent (12 mo) or future (30 days) visit within the authorizing provider's specialty     Patient had office visit in the last 12 months or has a visit in the next 30 days with authorizing provider or within the authorizing provider's specialty.  See \"Patient Info\" tab in inbasket, or \"Choose Columns\" in Meds & Orders section of the refill encounter.              Passed - Medication is active on med list        Passed - Patient is age 18 or older        Passed - Normal serum creatinine on file in past 12 months     Recent Labs   Lab Test 10/23/18  1717   CR 0.88              Passed - Normal serum potassium on file in past 12 months     Recent Labs   Lab Test 10/23/18  1717   POTASSIUM 4.2                    Passed - Normal serum sodium on file in past 12 months     Recent Labs   Lab Test 10/23/18  1717                 "

## 2019-04-16 RX ORDER — LISINOPRIL AND HYDROCHLOROTHIAZIDE 12.5; 2 MG/1; MG/1
2 TABLET ORAL DAILY
Qty: 30 TABLET | Refills: 0 | Status: SHIPPED | OUTPATIENT
Start: 2019-04-16 | End: 2020-02-12

## 2019-04-16 NOTE — TELEPHONE ENCOUNTER
Medication is being filled for 1 time refill only due to:  overdue for BP recheck, letter sent to make appt phoebe Stanley RN, BS  Clinical Nurse Triage.

## 2019-05-22 ENCOUNTER — TELEPHONE (OUTPATIENT)
Dept: FAMILY MEDICINE | Facility: CLINIC | Age: 34
End: 2019-05-22

## 2019-05-22 NOTE — TELEPHONE ENCOUNTER
"  Panel Management Review      Patient has the following on his problem list:   Hypertension   Last three blood pressure readings:  BP Readings from Last 3 Encounters:   03/19/19 (!) 146/110   03/05/19 (!) 162/108   10/23/18 (!) 164/116     Blood pressure: FAILED    HTN Guidelines:  Less than 140/90      Composite cancer screening  Chart review shows that this patient is due/due soon for the following None  Summary:    Patient is due/failing the following:   BP CHECK    Action needed:   Patient needs office visit for Blood Pressure check.    Type of outreach:    Phone, spoke to patient.  \"I will have to talk talk to my wife ad call you back\"    Questions for provider review:    None                                                                                                                                    Lydia Guerrero MA         Chart routed to  .        Care Team  "

## 2019-07-14 ENCOUNTER — HOSPITAL ENCOUNTER (OUTPATIENT)
Facility: CLINIC | Age: 34
Setting detail: OBSERVATION
Discharge: HOME | End: 2019-07-15
Attending: EMERGENCY MEDICINE | Admitting: INTERNAL MEDICINE
Payer: COMMERCIAL

## 2019-07-14 ENCOUNTER — APPOINTMENT (OUTPATIENT)
Dept: GENERAL RADIOLOGY | Facility: CLINIC | Age: 34
End: 2019-07-14
Attending: EMERGENCY MEDICINE
Payer: COMMERCIAL

## 2019-07-14 DIAGNOSIS — M10.9 ACUTE GOUT OF RIGHT HAND, UNSPECIFIED CAUSE: Primary | ICD-10-CM

## 2019-07-14 PROBLEM — L03.90 CELLULITIS: Status: ACTIVE | Noted: 2019-07-14

## 2019-07-14 LAB
ALBUMIN SERPL-MCNC: 3.2 G/DL (ref 3.4–5)
ALP SERPL-CCNC: 33 U/L (ref 40–150)
ALT SERPL W P-5'-P-CCNC: 28 U/L (ref 0–70)
ANION GAP SERPL CALCULATED.3IONS-SCNC: 6 MMOL/L (ref 3–14)
AST SERPL W P-5'-P-CCNC: 25 U/L (ref 0–45)
BASOPHILS # BLD AUTO: 0 10E9/L (ref 0–0.2)
BASOPHILS NFR BLD AUTO: 0.5 %
BILIRUB SERPL-MCNC: 0.4 MG/DL (ref 0.2–1.3)
BUN SERPL-MCNC: 13 MG/DL (ref 7–30)
CALCIUM SERPL-MCNC: 8.8 MG/DL (ref 8.5–10.1)
CHLORIDE SERPL-SCNC: 108 MMOL/L (ref 94–109)
CO2 SERPL-SCNC: 26 MMOL/L (ref 20–32)
CREAT SERPL-MCNC: 0.71 MG/DL (ref 0.66–1.25)
CRP SERPL-MCNC: 67.4 MG/L (ref 0–8)
DIFFERENTIAL METHOD BLD: ABNORMAL
EOSINOPHIL # BLD AUTO: 0 10E9/L (ref 0–0.7)
EOSINOPHIL NFR BLD AUTO: 0.3 %
ERYTHROCYTE [DISTWIDTH] IN BLOOD BY AUTOMATED COUNT: 12.7 % (ref 10–15)
ERYTHROCYTE [SEDIMENTATION RATE] IN BLOOD BY WESTERGREN METHOD: 79 MM/H (ref 0–15)
GFR SERPL CREATININE-BSD FRML MDRD: >90 ML/MIN/{1.73_M2}
GLUCOSE SERPL-MCNC: 91 MG/DL (ref 70–99)
HCT VFR BLD AUTO: 37.8 % (ref 40–53)
HGB BLD-MCNC: 13.3 G/DL (ref 13.3–17.7)
IMM GRANULOCYTES # BLD: 0.1 10E9/L (ref 0–0.4)
IMM GRANULOCYTES NFR BLD: 0.7 %
LYMPHOCYTES # BLD AUTO: 1.4 10E9/L (ref 0.8–5.3)
LYMPHOCYTES NFR BLD AUTO: 18.5 %
MCH RBC QN AUTO: 34 PG (ref 26.5–33)
MCHC RBC AUTO-ENTMCNC: 35.2 G/DL (ref 31.5–36.5)
MCV RBC AUTO: 97 FL (ref 78–100)
MONOCYTES # BLD AUTO: 0.9 10E9/L (ref 0–1.3)
MONOCYTES NFR BLD AUTO: 12.4 %
NEUTROPHILS # BLD AUTO: 5.1 10E9/L (ref 1.6–8.3)
NEUTROPHILS NFR BLD AUTO: 67.6 %
NRBC # BLD AUTO: 0 10*3/UL
NRBC BLD AUTO-RTO: 0 /100
PLATELET # BLD AUTO: 191 10E9/L (ref 150–450)
POTASSIUM SERPL-SCNC: 3.6 MMOL/L (ref 3.4–5.3)
PROT SERPL-MCNC: 7.2 G/DL (ref 6.8–8.8)
RBC # BLD AUTO: 3.91 10E12/L (ref 4.4–5.9)
SODIUM SERPL-SCNC: 140 MMOL/L (ref 133–144)
URATE SERPL-MCNC: 4.4 MG/DL (ref 3.5–7.2)
WBC # BLD AUTO: 7.6 10E9/L (ref 4–11)

## 2019-07-14 PROCEDURE — 86140 C-REACTIVE PROTEIN: CPT | Performed by: EMERGENCY MEDICINE

## 2019-07-14 PROCEDURE — 25000132 ZZH RX MED GY IP 250 OP 250 PS 637: Performed by: EMERGENCY MEDICINE

## 2019-07-14 PROCEDURE — 87040 BLOOD CULTURE FOR BACTERIA: CPT | Performed by: EMERGENCY MEDICINE

## 2019-07-14 PROCEDURE — 25800030 ZZH RX IP 258 OP 636: Performed by: EMERGENCY MEDICINE

## 2019-07-14 PROCEDURE — 25000128 H RX IP 250 OP 636: Performed by: INTERNAL MEDICINE

## 2019-07-14 PROCEDURE — 96376 TX/PRO/DX INJ SAME DRUG ADON: CPT

## 2019-07-14 PROCEDURE — 36415 COLL VENOUS BLD VENIPUNCTURE: CPT | Performed by: EMERGENCY MEDICINE

## 2019-07-14 PROCEDURE — 96375 TX/PRO/DX INJ NEW DRUG ADDON: CPT

## 2019-07-14 PROCEDURE — 84550 ASSAY OF BLOOD/URIC ACID: CPT | Performed by: EMERGENCY MEDICINE

## 2019-07-14 PROCEDURE — 25000125 ZZHC RX 250: Performed by: INTERNAL MEDICINE

## 2019-07-14 PROCEDURE — 99285 EMERGENCY DEPT VISIT HI MDM: CPT | Mod: 25

## 2019-07-14 PROCEDURE — 73100 X-RAY EXAM OF WRIST: CPT | Mod: RT

## 2019-07-14 PROCEDURE — 96374 THER/PROPH/DIAG INJ IV PUSH: CPT

## 2019-07-14 PROCEDURE — 96361 HYDRATE IV INFUSION ADD-ON: CPT

## 2019-07-14 PROCEDURE — 85652 RBC SED RATE AUTOMATED: CPT | Performed by: EMERGENCY MEDICINE

## 2019-07-14 PROCEDURE — 25000132 ZZH RX MED GY IP 250 OP 250 PS 637: Performed by: INTERNAL MEDICINE

## 2019-07-14 PROCEDURE — G0378 HOSPITAL OBSERVATION PER HR: HCPCS

## 2019-07-14 PROCEDURE — 80053 COMPREHEN METABOLIC PANEL: CPT | Performed by: EMERGENCY MEDICINE

## 2019-07-14 PROCEDURE — 99219 ZZC INITIAL OBSERVATION CARE,LEVL II: CPT | Performed by: INTERNAL MEDICINE

## 2019-07-14 PROCEDURE — 85025 COMPLETE CBC W/AUTO DIFF WBC: CPT | Performed by: EMERGENCY MEDICINE

## 2019-07-14 PROCEDURE — 25800030 ZZH RX IP 258 OP 636: Performed by: INTERNAL MEDICINE

## 2019-07-14 PROCEDURE — 25000128 H RX IP 250 OP 636: Performed by: EMERGENCY MEDICINE

## 2019-07-14 RX ORDER — ONDANSETRON 4 MG/1
4 TABLET, ORALLY DISINTEGRATING ORAL EVERY 6 HOURS PRN
Status: DISCONTINUED | OUTPATIENT
Start: 2019-07-14 | End: 2019-07-15 | Stop reason: HOSPADM

## 2019-07-14 RX ORDER — ONDANSETRON 2 MG/ML
4 INJECTION INTRAMUSCULAR; INTRAVENOUS EVERY 6 HOURS PRN
Status: DISCONTINUED | OUTPATIENT
Start: 2019-07-14 | End: 2019-07-15 | Stop reason: HOSPADM

## 2019-07-14 RX ORDER — ONDANSETRON 2 MG/ML
4 INJECTION INTRAMUSCULAR; INTRAVENOUS
Status: COMPLETED | OUTPATIENT
Start: 2019-07-14 | End: 2019-07-14

## 2019-07-14 RX ORDER — OXYCODONE HYDROCHLORIDE 5 MG/1
5-10 TABLET ORAL
Status: DISCONTINUED | OUTPATIENT
Start: 2019-07-14 | End: 2019-07-15 | Stop reason: HOSPADM

## 2019-07-14 RX ORDER — BISACODYL 10 MG
10 SUPPOSITORY, RECTAL RECTAL DAILY PRN
Status: DISCONTINUED | OUTPATIENT
Start: 2019-07-14 | End: 2019-07-15 | Stop reason: HOSPADM

## 2019-07-14 RX ORDER — HYDRALAZINE HYDROCHLORIDE 20 MG/ML
10 INJECTION INTRAMUSCULAR; INTRAVENOUS EVERY 4 HOURS PRN
Status: DISCONTINUED | OUTPATIENT
Start: 2019-07-14 | End: 2019-07-15 | Stop reason: HOSPADM

## 2019-07-14 RX ORDER — PROCHLORPERAZINE 25 MG
25 SUPPOSITORY, RECTAL RECTAL EVERY 12 HOURS PRN
Status: DISCONTINUED | OUTPATIENT
Start: 2019-07-14 | End: 2019-07-15 | Stop reason: HOSPADM

## 2019-07-14 RX ORDER — LISINOPRIL 20 MG/1
20 TABLET ORAL DAILY
Status: DISCONTINUED | OUTPATIENT
Start: 2019-07-14 | End: 2019-07-15 | Stop reason: HOSPADM

## 2019-07-14 RX ORDER — HYDROMORPHONE HYDROCHLORIDE 1 MG/ML
0.5 INJECTION, SOLUTION INTRAMUSCULAR; INTRAVENOUS; SUBCUTANEOUS
Status: DISCONTINUED | OUTPATIENT
Start: 2019-07-14 | End: 2019-07-14

## 2019-07-14 RX ORDER — IBUPROFEN 800 MG/1
800 TABLET, FILM COATED ORAL ONCE
Status: COMPLETED | OUTPATIENT
Start: 2019-07-14 | End: 2019-07-14

## 2019-07-14 RX ORDER — POLYETHYLENE GLYCOL 3350 17 G/17G
17 POWDER, FOR SOLUTION ORAL DAILY PRN
Status: DISCONTINUED | OUTPATIENT
Start: 2019-07-14 | End: 2019-07-15 | Stop reason: HOSPADM

## 2019-07-14 RX ORDER — HYDROXYZINE HYDROCHLORIDE 25 MG/1
25-50 TABLET, FILM COATED ORAL EVERY 6 HOURS PRN
Status: DISCONTINUED | OUTPATIENT
Start: 2019-07-14 | End: 2019-07-15 | Stop reason: HOSPADM

## 2019-07-14 RX ORDER — SODIUM CHLORIDE 9 MG/ML
INJECTION, SOLUTION INTRAVENOUS CONTINUOUS
Status: DISCONTINUED | OUTPATIENT
Start: 2019-07-14 | End: 2019-07-15 | Stop reason: HOSPADM

## 2019-07-14 RX ORDER — AMOXICILLIN 250 MG
1 CAPSULE ORAL 2 TIMES DAILY PRN
Status: DISCONTINUED | OUTPATIENT
Start: 2019-07-14 | End: 2019-07-15 | Stop reason: HOSPADM

## 2019-07-14 RX ORDER — CEFAZOLIN SODIUM 2 G/100ML
2 INJECTION, SOLUTION INTRAVENOUS ONCE
Status: DISCONTINUED | OUTPATIENT
Start: 2019-07-14 | End: 2019-07-14

## 2019-07-14 RX ORDER — AMOXICILLIN 250 MG
2 CAPSULE ORAL 2 TIMES DAILY PRN
Status: DISCONTINUED | OUTPATIENT
Start: 2019-07-14 | End: 2019-07-15 | Stop reason: HOSPADM

## 2019-07-14 RX ORDER — METHYLPREDNISOLONE SODIUM SUCCINATE 125 MG/2ML
80 INJECTION, POWDER, LYOPHILIZED, FOR SOLUTION INTRAMUSCULAR; INTRAVENOUS ONCE
Status: COMPLETED | OUTPATIENT
Start: 2019-07-14 | End: 2019-07-14

## 2019-07-14 RX ORDER — INDOMETHACIN 50 MG/1
50 CAPSULE ORAL 3 TIMES DAILY PRN
Status: DISCONTINUED | OUTPATIENT
Start: 2019-07-14 | End: 2019-07-15 | Stop reason: HOSPADM

## 2019-07-14 RX ORDER — SODIUM CHLORIDE 9 MG/ML
1000 INJECTION, SOLUTION INTRAVENOUS CONTINUOUS
Status: DISCONTINUED | OUTPATIENT
Start: 2019-07-14 | End: 2019-07-14

## 2019-07-14 RX ORDER — NALOXONE HYDROCHLORIDE 0.4 MG/ML
.1-.4 INJECTION, SOLUTION INTRAMUSCULAR; INTRAVENOUS; SUBCUTANEOUS
Status: DISCONTINUED | OUTPATIENT
Start: 2019-07-14 | End: 2019-07-15 | Stop reason: HOSPADM

## 2019-07-14 RX ORDER — CEFAZOLIN SODIUM 2 G/100ML
2 INJECTION, SOLUTION INTRAVENOUS EVERY 8 HOURS
Status: DISCONTINUED | OUTPATIENT
Start: 2019-07-14 | End: 2019-07-15

## 2019-07-14 RX ORDER — PROCHLORPERAZINE MALEATE 10 MG
10 TABLET ORAL EVERY 6 HOURS PRN
Status: DISCONTINUED | OUTPATIENT
Start: 2019-07-14 | End: 2019-07-15 | Stop reason: HOSPADM

## 2019-07-14 RX ORDER — HYDROMORPHONE HYDROCHLORIDE 1 MG/ML
0.2 INJECTION, SOLUTION INTRAMUSCULAR; INTRAVENOUS; SUBCUTANEOUS
Status: DISCONTINUED | OUTPATIENT
Start: 2019-07-14 | End: 2019-07-15 | Stop reason: HOSPADM

## 2019-07-14 RX ORDER — CLINDAMYCIN PHOSPHATE 900 MG/50ML
900 INJECTION, SOLUTION INTRAVENOUS EVERY 8 HOURS
Status: DISCONTINUED | OUTPATIENT
Start: 2019-07-14 | End: 2019-07-15

## 2019-07-14 RX ORDER — ACETAMINOPHEN 650 MG/1
650 SUPPOSITORY RECTAL EVERY 4 HOURS PRN
Status: DISCONTINUED | OUTPATIENT
Start: 2019-07-14 | End: 2019-07-15 | Stop reason: HOSPADM

## 2019-07-14 RX ORDER — ACETAMINOPHEN 325 MG/1
650 TABLET ORAL EVERY 4 HOURS PRN
Status: DISCONTINUED | OUTPATIENT
Start: 2019-07-14 | End: 2019-07-15 | Stop reason: HOSPADM

## 2019-07-14 RX ORDER — CEFAZOLIN SODIUM 1 G/50ML
2000 SOLUTION INTRAVENOUS ONCE
Status: COMPLETED | OUTPATIENT
Start: 2019-07-14 | End: 2019-07-14

## 2019-07-14 RX ORDER — LIDOCAINE 40 MG/G
CREAM TOPICAL
Status: DISCONTINUED | OUTPATIENT
Start: 2019-07-14 | End: 2019-07-14

## 2019-07-14 RX ADMIN — LISINOPRIL 20 MG: 20 TABLET ORAL at 20:17

## 2019-07-14 RX ADMIN — HYDRALAZINE HYDROCHLORIDE 10 MG: 20 INJECTION INTRAMUSCULAR; INTRAVENOUS at 22:19

## 2019-07-14 RX ADMIN — METHYLPREDNISOLONE SODIUM SUCCINATE 81.25 MG: 125 INJECTION, POWDER, FOR SOLUTION INTRAMUSCULAR; INTRAVENOUS at 19:03

## 2019-07-14 RX ADMIN — OXYCODONE HYDROCHLORIDE 10 MG: 5 TABLET ORAL at 23:30

## 2019-07-14 RX ADMIN — ONDANSETRON HYDROCHLORIDE 4 MG: 2 INJECTION, SOLUTION INTRAMUSCULAR; INTRAVENOUS at 16:48

## 2019-07-14 RX ADMIN — CEFAZOLIN SODIUM 2 G: 2 INJECTION, SOLUTION INTRAVENOUS at 22:18

## 2019-07-14 RX ADMIN — HYDROMORPHONE HYDROCHLORIDE 0.5 MG: 1 INJECTION, SOLUTION INTRAMUSCULAR; INTRAVENOUS; SUBCUTANEOUS at 16:48

## 2019-07-14 RX ADMIN — INDOMETHACIN 50 MG: 50 CAPSULE ORAL at 21:14

## 2019-07-14 RX ADMIN — HYDROMORPHONE HYDROCHLORIDE 0.5 MG: 1 INJECTION, SOLUTION INTRAMUSCULAR; INTRAVENOUS; SUBCUTANEOUS at 19:00

## 2019-07-14 RX ADMIN — OXYCODONE HYDROCHLORIDE 5 MG: 5 TABLET ORAL at 20:17

## 2019-07-14 RX ADMIN — SODIUM CHLORIDE 1000 ML: 9 INJECTION, SOLUTION INTRAVENOUS at 16:49

## 2019-07-14 RX ADMIN — SODIUM CHLORIDE: 9 INJECTION, SOLUTION INTRAVENOUS at 21:14

## 2019-07-14 RX ADMIN — IBUPROFEN 800 MG: 800 TABLET, FILM COATED ORAL at 15:43

## 2019-07-14 RX ADMIN — VANCOMYCIN HYDROCHLORIDE 2000 MG: 10 INJECTION, POWDER, LYOPHILIZED, FOR SOLUTION INTRAVENOUS at 17:05

## 2019-07-14 RX ADMIN — CLINDAMYCIN PHOSPHATE 900 MG: 900 INJECTION, SOLUTION INTRAVENOUS at 21:14

## 2019-07-14 ASSESSMENT — ENCOUNTER SYMPTOMS
ARTHRALGIAS: 1
CHILLS: 0
JOINT SWELLING: 1
COLOR CHANGE: 1
FEVER: 0

## 2019-07-14 ASSESSMENT — MIFFLIN-ST. JEOR
SCORE: 1789.02
SCORE: 1769.5

## 2019-07-14 NOTE — ED TRIAGE NOTES
Pt has right hand and fiorearm redness, pain and swelling since Thursday which is getting worse.  Pt sent [facility] ED from orthopedic.

## 2019-07-14 NOTE — ED NOTES
"[facility]  ED Nurse Handoff Report    Stephane Vergara is a 34 year old male   ED Chief complaint: Hand Injury  . ED Diagnosis:   Final diagnoses:   None     Allergies:   Allergies   Allergen Reactions     No Known Drug Allergies        Code Status: Full Code  Activity level - Baseline/Home:  Independent. Activity Level - Current:   Independent. Lift room needed: No. Bariatric: No   Needed: No   Isolation: No. Infection: Not Applicable.     Vital Signs:   Vitals:    07/14/19 1539   BP: (!) 178/118   Resp: 20   Temp: 99.7  F (37.6  C)   TempSrc: Oral   SpO2: 95%   Weight: 86 kg (189 lb 9.5 oz)   Height: 1.727 m (5' 8\")       Cardiac Rhythm:  ,      Pain level: 0-10 Pain Scale: 10  Patient confused: No. Patient Falls Risk: Yes.   Elimination Status: Has voided   Patient Report - Initial Complaint: hand pain . Focused Assessment:  Skin - Skin WDL: -WDL except  Skin Turgor: slow return to original state  Inspection of bony prominences: Full except (identify areas not inspected)  Skin Comment: Pt presents with havin increased swelling and pain in the right hand does have many cuts from work on hand. Cap refill WNL.  Tests Performed:   XR Wrist Right 2 Views   Final Result   IMPRESSION: Probable fracture at the base of the metacarpals vs. the   distal carpal row seen on lateral view. No dislocation. No definite   erosive change or subcutaneous emphysema demonstrated.      SHARRON GOULD MD        Labs Ordered and Resulted from Time of ED Arrival Up to the Time of Departure from the ED   CBC WITH PLATELETS DIFFERENTIAL - Abnormal; Notable for the following components:       Result Value    RBC Count 3.91 (*)     Hematocrit 37.8 (*)     MCH 34.0 (*)     All other components within normal limits   COMPREHENSIVE PANEL ADDITION - Abnormal; Notable for the following components:    Alkaline Phosphatase 33 (*)     Albumin 3.2 (*)     All other components within normal limits   CRP INFLAMMATION - Abnormal; " Notable for the following components:    CRP Inflammation 67.4 (*)     All other components within normal limits   ERYTHROCYTE SEDIMENTATION RATE AUTO - Abnormal; Notable for the following components:    Sed Rate 79 (*)     All other components within normal limits   BASIC METABOLIC PANEL   PERIPHERAL IV CATHETER   NURSING DRAW AND HOLD   PULSE OXIMETRY NURSING   PERIPHERAL IV CATHETER   APPLY WARM MOIST PACK   BLOOD CULTURE   BLOOD CULTURE     Treatments provided:   Medications   lidocaine 1 % 0.1-1 mL (has no administration in time range)   lidocaine (LMX4) cream (has no administration in time range)   sodium chloride (PF) 0.9% PF flush 3 mL (has no administration in time range)   sodium chloride (PF) 0.9% PF flush 3 mL (has no administration in time range)   0.9% sodium chloride BOLUS (1,000 mLs Intravenous New Bag 7/14/19 1649)     Followed by   sodium chloride 0.9% infusion (has no administration in time range)   HYDROmorphone (PF) (DILAUDID) injection 0.5 mg (0.5 mg Intravenous Given 7/14/19 1648)   vancomycin (VANCOCIN) 2,000 mg in sodium chloride 0.9 % 500 mL intermittent infusion (has no administration in time range)   methylPREDNISolone sodium succinate (solu-MEDROL) injection 81.25 mg (has no administration in time range)   ibuprofen (ADVIL/MOTRIN) tablet 800 mg (800 mg Oral Given 7/14/19 1543)   ondansetron (ZOFRAN) injection 4 mg (4 mg Intravenous Given 7/14/19 1648)       Family Comments: n/a   OBS brochure/video discussed/provided to patient:  Yes  ED Medications:   Medications   lidocaine 1 % 0.1-1 mL (has no administration in time range)   lidocaine (LMX4) cream (has no administration in time range)   sodium chloride (PF) 0.9% PF flush 3 mL (has no administration in time range)   sodium chloride (PF) 0.9% PF flush 3 mL (has no administration in time range)   0.9% sodium chloride BOLUS (1,000 mLs Intravenous New Bag 7/14/19 1649)     Followed by   sodium chloride 0.9% infusion (has no administration  in time range)   HYDROmorphone (PF) (DILAUDID) injection 0.5 mg (0.5 mg Intravenous Given 7/14/19 1648)   vancomycin (VANCOCIN) 2,000 mg in sodium chloride 0.9 % 500 mL intermittent infusion (has no administration in time range)   methylPREDNISolone sodium succinate (solu-MEDROL) injection 81.25 mg (has no administration in time range)   ibuprofen (ADVIL/MOTRIN) tablet 800 mg (800 mg Oral Given 7/14/19 1543)   ondansetron (ZOFRAN) injection 4 mg (4 mg Intravenous Given 7/14/19 1648)     Drips infusing:  No  For the majority of the shift, the patient's behavior Green. Interventions performed were monitor  .     Severe Sepsis OR Septic Shock Diagnosis Present: No      ED Nurse Name/Phone Number: Dean Olivarez,   6:55 PM  RECEIVING UNIT ED HANDOFF REVIEW    Above ED Nurse Handoff Report was reviewed: Yes  Reviewed by: Ana Walker on July 14, 2019 at 7:30 PM

## 2019-07-14 NOTE — ED PROVIDER NOTES
"  History     Chief Complaint:  Hand swelling and pain    HPI   Stephane Vergara is a right-handed 34 year old male with a history of hypertension and gout (per patient) who presents to the emergency department for evaluation of right hand redness and swelling. The patient reports that 3 days prior he began experiencing right hand swelling and made an appointment for a cortisone injection on 7/15. The patient woke up today with increased swelling, redness and pain. He went to [facility] today and was seen by one of the hand surgeons, and was referred to the emergency department. He works as a kent, but cannot remember an incident that caused his symptoms. He denies any fever or chills.    Allergies:  No Known Drug Allergies    Medications:    Prinzide    Past Medical History:    Alcohol abuse  Hypertension  Gout (per patient)    Past Surgical History:    Foot surgery  Inguinal hernia repair    Family History:    Hypertension    Social History:  The patient presented alone.  Smoking Status: Current Everyday  Smokeless Tobacco: Former  Alcohol Use: Yes  Drug Use: No  Marital Status:  Single      Review of Systems   Constitutional: Negative for chills and fever.   Musculoskeletal: Positive for arthralgias (right wrist) and joint swelling (right wrist).   Skin: Positive for color change.   All other systems reviewed and are negative.        Physical Exam   Vitals:  Patient Vitals for the past 24 hrs:   BP Temp Temp src Heart Rate Resp SpO2 Height Weight   07/14/19 2212 (!) 181/114 -- -- 68 -- -- -- --   07/14/19 1951 (!) 185/115 98.8  F (37.1  C) -- 66 18 96 % 1.727 m (5' 8\") 88 kg (193 lb 14.4 oz)   07/14/19 1539 (!) 178/118 99.7  F (37.6  C) Oral 96 20 95 % 1.727 m (5' 8\") 86 kg (189 lb 9.5 oz)       Physical Exam     General: Patient is in discomfort and resting his swollen right hand on a pillow.   HEENT:   The scalp and head appear normal    The pupils are equal, round, and reactive to " light    Extraocular muscles are intact.    The nose is normal.    The oropharynx is normal.      Uvula is in the midline.    Neck:  Normal range of motion.    Lungs:  Clear.      No rales, no wheezing.      There is no tachypnea.  Non-labored.  Cardiac: Regular rate.      Normal S1 and S2.      No pathological murmur/rub    Abdomen: Soft. No distension, no localized tenderness or rebound.  MS:  Normal tone. Normal movement of all extremities. It hurst to flex and extend his wrist  He can extend all of his fingers.  Neurologic:     Normal mentation.  No cranial nerve deficits.  No focal motor or sensory                            changes.      Speech normal.  Psych:  Awake.     Alert.      Normal affect.      Appropriate interactions.  Skin:  No rash.      No lesions.    No auxiliary     Swelling over the dorsum of thr right hand and erythema    Erythremia spreading up above the wrist        Emergency Department Course     Imaging:  Radiographic findings were communicated with the patient who voiced understanding of the findings.    XR Wrist Right 2 views  Probable fracture at the base of the metacarpals vs. the  distal carpal row seen on lateral view. No dislocation. No definite  erosive change or subcutaneous emphysema demonstrated.  Reading per radiology.    Laboratory:  CBC: WNL. (WBC 7.6, HGB 13.3, )  BMP: AWNL (Creatinine 0.71)    Comprehensive Panel Additional: Alkphos 33 (L) Albumin 3.2 (L)  CRP inflammation: 67.4 (H)    Erythrocyte sedimentation rate: 79 (H)    Blood culture: No growth after 1 hour  Blood culture: pending     Interventions:  1543 Advil 800mg PO  1648 Dilaudid 0.5 mg IV  1649 NS, 1 L, IV bolus  1705 Vancocin 2000mg IV  1900 Dilaudid 0.5 mg IV  1903 medrol 81.25mg IV    Emergency Department Course:  Nursing notes and vitals reviewed. 1637 I performed an exam of the patient as documented above.     IV inserted. Medicine administered as documented above. Blood drawn. This was sent to the  lab for further testing, results above.    The patient was sent for a wrist x-ray while in the emergency department, findings above.     1706 I spoke with hazel Wagner, regarding the patient.    1738 I spoke with hazel Wagner, regarding the patient.    1813 I spoke with Dr. Yates, hospitalist, who agreed to admit the patient.    Findings and plan explained to the Patient who consents to observation or admission. Discussed the patient with Dr. Yates, who will place the patient in obs bed for further monitoring, evaluation, and treatment.    I personally reviewed the laboratory results with the Patient and answered all related questions prior to admission.    Impression & Plan      Medical Decision Making:  Stephane Vergara is a 34 year old male right hand dominant who presents from [facility] after being sent over by one of the hand surgeons with probable hand infection consistent with cellulitis. My evaluation here reveals pain on a proportion one would expect with cellulitis. I had initially drawn blood cultures based on the history and what the patient's hand presented. He told me that the perithema and swelling started over the dorsum of the hand and progressed cephalad. In any event, Dr. Deluca was here seeing another patient and I asked him to see the patient. It was revealed to him that the patient had a remote history of gout, diagnosed presumptively without asperation or arthosentssis. He got getter with steroids at that time and analgesics. At this point I discussed with Dr. Deluca the plan. He felt that this was more likely gout than cellulitis and did contact his partner who had referred the patient to the ED from [facility]. It was his feeling that the patient should be on steroids and no further on antibiotics.    I discussed the findings with Dr. Yates, who is the accepting physician for obs. Dr. Deluca will round on the patient tomorrow. He will look for interval  improvement with the steroids. At this point I will avoid arthrocentesis incase this is cellulitis. I tend to lean toward gout at this point though because the patient has elevated CRP sed rate and pain out of proportion to what one would expect from cellulitis. Also moving the joint causes him some discomfort. The patient doesn't appear toxic, though he does have a low grade fever which we see in gout.     Diagnosis:  No diagnosis found.    Disposition:  Admitted to Dr. Yates for an obs bed.    I, Derek Chairez, am serving as a scribe on 7/14/2019 at 7:11 PM to personally document services performed by Elías New MD based on my observations and the provider's statements to me.       Derek Chairez  7/14/2019   [facility] EMERGENCY DEPARTMENT       Elías New MD  07/15/19 1972

## 2019-07-15 VITALS
DIASTOLIC BLOOD PRESSURE: 99 MMHG | WEIGHT: 193.9 LBS | OXYGEN SATURATION: 96 % | HEIGHT: 68 IN | SYSTOLIC BLOOD PRESSURE: 152 MMHG | TEMPERATURE: 99 F | RESPIRATION RATE: 18 BRPM | BODY MASS INDEX: 29.39 KG/M2

## 2019-07-15 LAB
ANION GAP SERPL CALCULATED.3IONS-SCNC: 6 MMOL/L (ref 3–14)
BUN SERPL-MCNC: 8 MG/DL (ref 7–30)
CALCIUM SERPL-MCNC: 8.8 MG/DL (ref 8.5–10.1)
CHLORIDE SERPL-SCNC: 105 MMOL/L (ref 94–109)
CO2 SERPL-SCNC: 25 MMOL/L (ref 20–32)
CREAT SERPL-MCNC: 0.54 MG/DL (ref 0.66–1.25)
GFR SERPL CREATININE-BSD FRML MDRD: >90 ML/MIN/{1.73_M2}
GLUCOSE SERPL-MCNC: 152 MG/DL (ref 70–99)
POTASSIUM SERPL-SCNC: 3.7 MMOL/L (ref 3.4–5.3)
SODIUM SERPL-SCNC: 136 MMOL/L (ref 133–144)

## 2019-07-15 PROCEDURE — 25000128 H RX IP 250 OP 636: Performed by: INTERNAL MEDICINE

## 2019-07-15 PROCEDURE — 25000125 ZZHC RX 250: Performed by: INTERNAL MEDICINE

## 2019-07-15 PROCEDURE — 36415 COLL VENOUS BLD VENIPUNCTURE: CPT | Performed by: INTERNAL MEDICINE

## 2019-07-15 PROCEDURE — 96376 TX/PRO/DX INJ SAME DRUG ADON: CPT

## 2019-07-15 PROCEDURE — 25000132 ZZH RX MED GY IP 250 OP 250 PS 637: Performed by: INTERNAL MEDICINE

## 2019-07-15 PROCEDURE — 99217 ZZC OBSERVATION CARE DISCHARGE: CPT | Performed by: PHYSICIAN ASSISTANT

## 2019-07-15 PROCEDURE — 80048 BASIC METABOLIC PNL TOTAL CA: CPT | Performed by: INTERNAL MEDICINE

## 2019-07-15 PROCEDURE — G0378 HOSPITAL OBSERVATION PER HR: HCPCS

## 2019-07-15 RX ORDER — IBUPROFEN 200 MG
200-600 TABLET ORAL EVERY 4 HOURS PRN
COMMUNITY
Start: 2019-07-15 | End: 2020-02-12

## 2019-07-15 RX ORDER — ACETAMINOPHEN 500 MG
500-1000 TABLET ORAL EVERY 6 HOURS PRN
COMMUNITY
Start: 2019-07-15 | End: 2020-02-12

## 2019-07-15 RX ORDER — METHYLPREDNISOLONE 4 MG
TABLET, DOSE PACK ORAL
Qty: 21 TABLET | Refills: 0 | Status: SHIPPED | OUTPATIENT
Start: 2019-07-15 | End: 2020-02-12

## 2019-07-15 RX ADMIN — LISINOPRIL 20 MG: 20 TABLET ORAL at 08:33

## 2019-07-15 RX ADMIN — CEFAZOLIN SODIUM 2 G: 2 INJECTION, SOLUTION INTRAVENOUS at 05:30

## 2019-07-15 RX ADMIN — CLINDAMYCIN PHOSPHATE 900 MG: 900 INJECTION, SOLUTION INTRAVENOUS at 04:19

## 2019-07-15 RX ADMIN — OXYCODONE HYDROCHLORIDE 10 MG: 5 TABLET ORAL at 04:19

## 2019-07-15 NOTE — PLAN OF CARE
PRIMARY DIAGNOSIS: RUE Cellulitis vs Gout   OUTPATIENT/OBSERVATION GOALS TO BE MET BEFORE DISCHARGE:  Vitals sign stable or return to baseline: Yes, BP has been increased, lisinopril given has PRN hydralazine      Tolerating oral antibiotics or has home infusion set up if applicable: No, IV vanco given in ED, IV cleocin and ancef for treatment     Pain status: Improved-controlled with oral pain medications.    Return to near baseline physical activity: Yes    Pt is A&Ox4. BP increased, lisinopril given and will recheck. IV ancef and cleocin for treatment. IVF. R hand is swollen and red, marked, not outside of lines. Ortho surgery consult. Ind in room. Regular diet, NPO at midnight. Will continue to monitor.     Discharge Planner Nurse   Safe discharge environment identified: Yes  Barriers to discharge: Yes       Entered by: Ana Walker 07/14/2019 10:05 PM     Please review provider order for any additional goals.     Nurse to notify provider when observation goals have been met and patient is ready for discharge.

## 2019-07-15 NOTE — DISCHARGE SUMMARY
[facility] Out[facility] / Observation Unit  Discharge Summary        Stephane Vergara MRN# [id number]   YOB: 1985 Age: 34 year old     Date of Admission:  7/14/2019  Date of Discharge:  7/15/2019  Admitting Physician:  Josef Yates DO  Discharge Physician: Suzi Wheeler PA-C  Discharging Service: Hospitalist      Primary Provider: [facility]  Primary Care Physician Phone Number: [phone]         Primary Discharge Diagnoses:    Stephane Vergara was admitted on 7/14/2019 for R hand swelling, pain and erythema.     1. R hand swelling and erythema - likely due to gout, improved overnight, did receive IV antibiotics and steroids. Orthopedics consulted, felt sx were due to gout, recommend Medrol dose pack on discharge, no further antibiotics and monitor. Follow up with Ortho in 1-2 weeks, sooner if sx are worsening. Continue PRN Tylenol and Ibuprofen for pain.   2. Possible R hand fx - follow up with Ortho        Secondary Discharge Diagnoses:     Past Medical History:   Diagnosis Date     Benign essential HTN      ETOH abuse                 Code Status:      Full Code        Brief Hospital Summary:        Reason for your hospital stay      Right hand swelling and erythema. Gout vs cellulitis. Orthopedics was   consulted and felt this was most likely gout and recommended a course of   steroids. Symptoms improved.             Please refer to initial admission history and physical for further details.   Briefly, Stephane Vergara was admitted on 7/14/2019 for acute right hand swelling and pain.   Initial work up in the ED did not reveal evidence of sepsis. XRs were done that did show findings concerning for possible fx of the right hand. He received IV antibiotics and steroids in the ED. Pt was registered to the Observation Unit for further evaluation and pain control.   Pt was continued on antibiotics and steroids and orthopedics was consulted. They felt sx were consistent with gout and  recommended continued steroid course. On the day of discharge, pt's pain and swelling had improved and is able to perform basic ADLs. Safe discharge plan was identified and pt was discharged with medrol dose pack.         Significant Lab During Hospitalization:      Recent Labs   Lab 07/14/19  1627   WBC 7.6   HGB 13.3   HCT 37.8*   MCV 97        Recent Labs   Lab 07/15/19  0625 07/14/19  1627    140   POTASSIUM 3.7 3.6   CHLORIDE 105 108   CO2 25 26   ANIONGAP 6 6   * 91   BUN 8 13   CR 0.54* 0.71   GFRESTIMATED >90 >90   GFRESTBLACK >90 >90   YENNY 8.8 8.8   PROTTOTAL  --  7.2   ALBUMIN  --  3.2*   BILITOTAL  --  0.4   ALKPHOS  --  33*   AST  --  25   ALT  --  28     Recent Labs   Lab 07/14/19  1627   SED 79*   CRP 67.4*                Significant Imaging During Hospitalization:      Recent Results (from the past 48 hour(s))   XR Wrist Right 2 Views    Narrative    WRIST TWO VIEWS RIGHT  7/14/2019 5:11 PM     HISTORY: Cellulitis, hand and wrist pain.    COMPARISON: None.      Impression    IMPRESSION: Probable fracture at the base of the metacarpals vs. the  distal carpal row seen on lateral view. No dislocation. No definite  erosive change or subcutaneous emphysema demonstrated.    SHARRON GOULD MD              Pending Results:        Unresulted Labs Ordered in the Past 30 Days of this Admission     Date and Time Order Name Status Description    7/14/2019 1646 Blood culture Preliminary     7/14/2019 1645 Blood culture Preliminary               Consultations This Hospital Stay:      Consultation during this admission received from orthopedics         Discharge Instructions and Follow-Up:      Follow-up Appointments     Follow-up and recommended labs and tests       Follow up with orthopedics in 1 week for hospital follow- up.  No follow   up labs or test are needed.           Pt instructed to follow up with PCP in 7 days.        Discharge Disposition:      Discharged to home         Discharge  "Medications:        Discharge Medication List as of 7/15/2019 10:32 AM      START taking these medications    Details   acetaminophen (TYLENOL) 500 MG tablet Take 1-2 tablets (500-1,000 mg) by mouth every 6 hours as needed for mild pain, OTC      ibuprofen (ADVIL/MOTRIN) 200 MG tablet Take 1-3 tablets (200-600 mg) by mouth every 4 hours as needed for mild pain, OTC      methylPREDNISolone (MEDROL DOSEPAK) 4 MG tablet therapy pack Follow Package Directions, Disp-21 tablet, R-0, E-Prescribe         CONTINUE these medications which have NOT CHANGED    Details   lisinopril-hydrochlorothiazide (PRINZIDE/ZESTORETIC) 20-12.5 MG tablet Take 2 tablets by mouth daily MUST SEE PROVIDER ASAP, Disp-30 tablet, R-0, E-Prescribe                 Allergies:         Allergies   Allergen Reactions     No Known Drug Allergies            Condition and Physical on Discharge:      Discharge condition: Stable   Vitals: Blood pressure (!) 152/99, temperature 99  F (37.2  C), temperature source Oral, resp. rate 18, height 1.727 m (5' 8\"), weight 88 kg (193 lb 14.4 oz), SpO2 96 %.  193 lbs 14.4 oz      GENERAL:  Comfortable.  PSYCH: pleasant, oriented, No acute distress.  HEART:  RRR  LUNGS:  Normal Respiratory effort.  EXTREMITIES:  Swelling and erythema to right hand, inside marked borders  SKIN:  Dry to touch, No rash, wound or ulcerations.  NEUROLOGIC:  Grossly intact      Suzi Wheeler PA-C  "

## 2019-07-15 NOTE — PROGRESS NOTES
ROOM # [id number]     Living Situation (if not independent, order SW consult): Home with wife   Facility name:  : [facility], [phone]    Activity level at baseline: Ind  Activity level on admit: Ind      Patient registered to observation; given Patient Bill of Rights; given the opportunity to ask questions about observation status and their plan of care.  Patient has been oriented to the observation room, bathroom and call light is in place.    Discussed discharge goals and expectations with patient/family.

## 2019-07-15 NOTE — PLAN OF CARE
PRIMARY DIAGNOSIS: RUE Cellulitis vs Gout   OUTPATIENT/OBSERVATION GOALS TO BE MET BEFORE DISCHARGE:  1. Vitals sign stable or return to baseline: Yes, BP has been increased,hydralazine given   2. Temp: 97.9  F (36.6  C) Temp src: Oral BP: (!) 161/112   Heart Rate: 65 Resp: 18 SpO2: 97 % O2 Device: None (Room air)       3. Tolerating oral antibiotics or has home infusion set up if applicable: No, IV vanco given in ED, IV cleocin and ancef for treatment     4. Pain status: Improved-controlled with oral pain medications.    5. Return to near baseline physical activity: Yes    Pt is A&Ox4. BP increased,hydralazin given, better now. IV ancef and cleocin for treatment. IVF. R hand is swollen and red, marked, not outside of lines. Ortho surgery consult. Ind in room. Regular diet, NPO at midnight. Will continue to monitor.     Discharge Planner Nurse   Safe discharge environment identified: Yes  Barriers to discharge: Yes       Entered by: Ana Walker 07/15/2019 1:55 AM     Please review provider order for any additional goals.     Nurse to notify provider when observation goals have been met and patient is ready for discharge.

## 2019-07-15 NOTE — CONSULTS
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  He started noticing on Friday, had increasing pain and swelling, and today had moderate swelling, fairly exquisite tenderness and pain along the dorsal aspect of his hand with movement of the wrist and fingers.  He was seen in urgent care with concern of cellulitis.  He does have a history of gout.  He has had gout in his great toe.  States that was quite painful, and same type of pain, but has more swelling and redness of his hand.  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  He is unaware of any specific traumatic event or injury.      PAST MEDICAL HISTORY:  Alcohol abuse, hypertension.  He states that has had gout that was treated empirically.  He has a family history of gout as well in his father, and his father always had normal uric aicd.      PHYSICAL EXAMINATION:     GENERAL:  A healthy-appearing gentleman.     EXTREMITIES:  He is able to move his fingers fairly well, and no palmar pain whatsoever.  He has moderate swelling over the dorsal aspect from the metacarpophalangeal joint up to the wrist.  Most exquisite tenderness is along the index and long finger CMC joint region.  There is a little bit of induration and erythema to the skin, and fairly exquisite tenderness in a localized area.  Otherwise, moderate swelling and edema but moves his fingers well.  No distal radioulnar joint pain or instability.  Tolerates gentle range of motion of the radiocarpal joint.  He does not appear to have any sepsis within the joint or articular surface.        X-RAYS:  His x-rays, AP, lateral and oblique films demonstrate no obvious soft tissue calcification.  A small avulsion is noted along  the proximal metacarpal, which could be old injury or may have an acute element to it or some cystic changes around it.  Otherwise, normal appearance.      LABORATORY DATA:  His sedimentation rate is elevated at 79.  CRP 67, elevated, but the CBC indicates normal findings with normal white count of 7.6 and currently afebrile with a temperature of 99.      IMPRESSION:  Right hand dorsal pain, swelling, inflammation.  The symptoms and sudden onset with atraumatic pain and swelling without any immunocompromise or lacerations, his history of gout is more suggestive of inflammatory arthropathy or gout.  There is some concern because of the erythema that he could have a cellulitis, but he is afebrile with a normal white count.  He has had some pain in this region in the past, which responded to a steroid injection, which also would be consistent with a history of previously focal area of gout.      PLAN:  I discussed with Dr. New initiating anti-inflammatory treatment.  He has had some ibuprofen and would suggest possible burst of steroids and a steroid taper.  We discussed IV antibiotics; however, may consider this empirically.  No obvious clear infection is noted.  We will see how he responds to anti-inflammatory with a steroid taper and possibly Indocin or Toradol.  We will continue to monitor symptoms.         DELLA ARRIOLA MD             D: 2019   T: 2019   MT: IRMA      Name:     KATYA ADAM   MRN:      [id number]        Account:       [id number]   :      1985           Consult Date:  2019      Document: [id number]

## 2019-07-15 NOTE — PLAN OF CARE
PRIMARY DIAGNOSIS: RUE Cellulitis vs Gout   OUTPATIENT/OBSERVATION GOALS TO BE MET BEFORE DISCHARGE:  1. Vitals sign stable or return to baseline: Yes, BP has been increased,hydralazine given   2. Temp: 98  F (36.7  C) Temp src: Oral BP: (!) 161/98   Heart Rate: 57 Resp: 18 SpO2: 96 % O2 Device: None (Room air)       3. Tolerating oral antibiotics or has home infusion set up if applicable: No, IV vanco given in ED, IV cleocin and ancef for treatment     4. Pain status: Improved-controlled with oral pain medications.    5. Return to near baseline physical activity: Yes    Pt is A&Ox4. BP increased,hydralazin given, better now. IV ancef and cleocin for treatment. IVF. R hand is swollen and red, marked, not outside of lines. Ortho surgery consult. Ind in room. Regular diet, NPO since midnight. Will continue to monitor.     Discharge Planner Nurse   Safe discharge environment identified: Yes  Barriers to discharge: Yes       Entered by: Ana Walker 07/15/2019 6:28 AM     Please review provider order for any additional goals.     Nurse to notify provider when observation goals have been met and patient is ready for discharge.

## 2019-07-15 NOTE — PLAN OF CARE
Patient Improving    PRIMARY DIAGNOSIS: ACUTE PAIN  OUTPATIENT/OBSERVATION GOALS TO BE MET BEFORE DISCHARGE:  1. Pain Status: Improved with use of alternative comfort measures i.e.: positioning - arm elevated    2. Return to near baseline physical activity: Yes    3. Cleared for discharge by consultants (if involved): Yes    Discharge Planner Nurse   Safe discharge environment identified: Yes  Barriers to discharge: No       Entered by: Erin Martin 07/15/2019 8:00AM     Please review provider order for any additional goals.   Nurse to notify provider when observation goals have been met and patient is ready for discharge.    Up ad janey, BP elevated, scheduled BP med given, denies pain at rest, LS clear, room air, BS A&Ax4, passing gas, ortho has seen - medrol dose pack, follow up outpt, will continue to monitor and provide supportive cares.

## 2019-07-15 NOTE — H&P
[facility]    History and Physical - Hospitalist Service       Date of Admission:  7/14/2019    Assessment & Plan   Stephane Vergara is a 34 year old male admitted on 7/14/2019. He has a past medical history significant for hypertension and gout.  He presented to emergency room for right hand swelling and pain.    1.  Right hand swelling and pain.  Possible cellulitis versus gout.  Has been seen by 2 separate orthopedic surgeons today.  1 of the orthopedic surgeons thought that he had cellulitis.  The second orthopedic surgeon thought he might have gout.  He did have a dose of IV vancomycin in the emergency room.  Also had a dose of IV methylprednisolone.  Check a uric acid level.  Orthopedic surgery consult.  Continue antibiotics with IV clindamycin and cefazolin for now.  Have pain medications available as needed, including indomethacin 50 mg every 8 hours as needed.  N.p.o. after midnight.    2.  Possible right hand fracture.  X-ray showed probable fracture at base of metacarpal.  Orthopedic surgery consult.  Pain medications as needed.    3.  Hypertension.  Restart lisinopril 20 mg a day.  Have IV hydralazine available as needed.     Diet: Regular diet  DVT Prophylaxis: Ambulate every shift  He Catheter: not present  Code Status: Full code    Josef Yates, DO  [facility]    ______________________________________________________________________    Chief Complaint   Right hand swelling and pain.    History is obtained from the patient    History of Present Illness   Stephane Vergara is a 34 year old male who has a past medical history significant for gout and hypertension.  He developed some mild swelling of his right hand 4 days ago.  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  However, he has not had any significant trauma anytime recently.  Swelling and pain have gotten worse over the past 4 days.  Pain medications at home do not seem to  be helping.  Is also having some mild redness on the back of his hand.  He is never had anything like this previously.  He did have an episode of gout previously in 1 of his feet.  Pain medications given in the emergency room have helped pain.  Has not noted any fevers.  No other complaints.    Review of Systems    The 10 point Review of Systems is negative other than noted in the HPI     Past Medical History    I have reviewed this patient's medical history and updated it with pertinent information if needed.   Past Medical History:   Diagnosis Date     Benign essential HTN      ETOH abuse        Past Surgical History   I have reviewed this patient's surgical history and updated it with pertinent information if needed.  Past Surgical History:   Procedure Laterality Date     C NONSPECIFIC PROCEDURE      ORIF foot     HC REPAIR ING HERNIA,5+Y/O,REDUCIBL      Inguinal Hernia Repair       Social History   I have reviewed this patient's social history and updated it with pertinent information if needed.  Social History     Tobacco Use     Smoking status: Current Every Day Smoker     Types: Other     Smokeless tobacco: Former User     Types: Chew     Quit date: 3/21/2007     Tobacco comment: early january quit, 2005   Substance Use Topics     Alcohol use: Yes     Alcohol/week: 0.0 oz     Comment: 1.75 vodlka every 3 days.     Drug use: No     Comment: stopped marijuana jan 2005       Family History   I have reviewed this patient's family history and updated it with pertinent information if needed.   Family History   Problem Relation Age of Onset     Cancer Paternal Grandfather         liver     Hypertension Mother      Dementia Paternal Grandmother      Diabetes No family hx of      Heart Disease No family hx of        Prior to Admission Medications   Prior to Admission Medications   Prescriptions Last Dose Informant Patient Reported? Taking?   lisinopril-hydrochlorothiazide (PRINZIDE/ZESTORETIC) 20-12.5 MG tablet More  than a month at Unknown time  No No   Sig: Take 2 tablets by mouth daily MUST SEE PROVIDER ASAP      Facility-Administered Medications: None     Allergies   Allergies   Allergen Reactions     No Known Drug Allergies        Physical Exam   Vital Signs: Temp: 99.7  F (37.6  C) Temp src: Oral BP: (!) 178/118   Heart Rate: 96 Resp: 20 SpO2: 95 % O2 Device: None (Room air)    Weight: 189 lbs 9.53 oz    Gen:  NAD, A&Ox3.  Eyes:  PERRL, sclera anicteric.  OP:  MMM, no lesions.  Neck:  Supple.  CV:  Regular, no murmurs.  Lung:  CTA b/l, normal effort.  Ab:  +BS, soft.  Skin:  Warm, dry to touch.  Right hand is mildly swollen compared to left hand.  Mild erythema over the dorsal aspect of his right hand that extends to the middle right forearm.  Ext:  No pitting edema LE b/l.      Data   Data reviewed today: I reviewed all medications, new labs and imaging results over the last 24 hours.     Recent Labs   Lab 07/14/19  1627   WBC 7.6   HGB 13.3   MCV 97         POTASSIUM 3.6   CHLORIDE 108   CO2 26   BUN 13   CR 0.71   ANIONGAP 6   YENNY 8.8   GLC 91   ALBUMIN 3.2*   PROTTOTAL 7.2   BILITOTAL 0.4   ALKPHOS 33*   ALT 28   AST 25

## 2019-07-15 NOTE — PROGRESS NOTES
Feeling much better this AM  steroids last night helped significantly supporting gout diagnosis    Afeb  Hand swelling down significantly   Better movement of fingers less pain less tenderness    Impression : gout inflammatory arthropathy    Responding to steroids would do medrol dose pack as outpatient   Also discussed indocin treatment  Will follow up in office

## 2019-07-15 NOTE — PLAN OF CARE
Patient's After Visit Summary was reviewed with patient.  Patient verbalized understanding of After Visit Summary, recommended follow up and was given an opportunity to ask questions.   Discharge medications sent home with patient/family: will  medrol dose pack and OTC - ibuprofen and tylenol  Discharged with: spouse    OBSERVATION patient END time: 7054

## 2019-07-17 ENCOUNTER — TELEPHONE (OUTPATIENT)
Dept: FAMILY MEDICINE | Facility: CLINIC | Age: 34
End: 2019-07-17

## 2019-07-18 NOTE — TELEPHONE ENCOUNTER
"ED / Discharge Outreach Protocol    Patient Contact    Attempt # 1    Was call answered?  Yes.  \"May I please speak with Stephane\"  Is patient available?   Yes    Hospital/TCU/ED for chronic condition Discharge Protocol    \"Hi, my name is Maddi Alvarez, a registered nurse, and I am calling from [facility].  I am calling to follow up and see how things are going for you after your recent emergency visit/hospital/TCU stay.\"    Tell me how you are doing now that you are home?\" Doing much better since arriving home. Hand symptoms are much better, no issues taking prednisone.       Discharge Instructions    \"Let's review your discharge instructions.  What is/are the follow-up recommendations?  Pt. Response: Follow up with Orthopedics    \"Has an appointment with your primary care provider been scheduled?\"   Following up with Ortho. The Pt states he will schedule.     \"When you see the provider, I would recommend that you bring your medications with you.\"    Medications    \"Tell me what changed about your medicines when you discharged?\"    Changes to chronic meds?    0-1    \"What questions do you have about your medications?\"    None     New diagnoses of heart failure, COPD, diabetes, or MI?    No              Medication reconciliation completed? Yes  Was MTM referral placed (*Make sure to put transitions as reason for referral)?   No    Call Summary    \"What questions or concerns do you have about your recent visit and your follow-up care?\"     none    \"If you have questions or things don't continue to improve, we encourage you contact us through the main clinic number (give number).  Even if the clinic is not open, triage nurses are available 24/7 to help you.     We would like you to know that our clinic has extended hours (provide information).  We also have urgent care (provide details on closest location and hours/contact info)\"      \"Thank you for your time and take care!\"    Maddi Alvarez RN -- [place] " [employer]

## 2019-07-20 LAB
BACTERIA SPEC CULT: NO GROWTH
BACTERIA SPEC CULT: NO GROWTH
Lab: NORMAL
Lab: NORMAL
SPECIMEN SOURCE: NORMAL
SPECIMEN SOURCE: NORMAL

## 2019-08-30 ENCOUNTER — TELEPHONE (OUTPATIENT)
Dept: FAMILY MEDICINE | Facility: CLINIC | Age: 34
End: 2019-08-30

## 2019-08-30 NOTE — TELEPHONE ENCOUNTER
----- Message from JEAN MARIE Garcia CNP sent at 8/30/2019  9:25 AM CDT -----  Regarding: no show--please call to help reschedule  Stephane no-showed his appt today.  He is past due for BP follow up. Please call to help reschedule.     Nery Lyon CNP

## 2019-08-30 NOTE — TELEPHONE ENCOUNTER
Phone, spoke with patient. Informed of missed appointment today, and the need for follow up. He apologized. Tried to reschedule appointment with him. He stated he needed to talk with wife about scheduling. So he wanted to call back to schedule appointment for follow up.     Griselda Larson MA

## 2020-02-05 ENCOUNTER — TELEPHONE (OUTPATIENT)
Dept: FAMILY MEDICINE | Facility: CLINIC | Age: 35
End: 2020-02-05

## 2020-02-05 NOTE — TELEPHONE ENCOUNTER
Panel Management Review      Patient has the following on his problem list:     Hypertension   Last three blood pressure readings:  BP Readings from Last 3 Encounters:   07/15/19 (!) 152/99   03/19/19 (!) 146/110   03/05/19 (!) 162/108     Blood pressure: FAILED    HTN Guidelines:  Less than 140/90      Composite cancer screening  Chart review shows that this patient is due/due soon for the following None  Summary:    Patient is due/failing the following:   BP CHECK and PHYSICAL    Action needed:   Patient needs office visit for physical and blood pressure check.    Type of outreach:    Phone, left message for patient to call back.     Questions for provider review:    None                                                                                                                                    Lydia Guerrero MA     Chart routed to Care Team .

## 2020-02-05 NOTE — LETTER
[facility]  [address]  [address]  [facility] [address]  [phone]  February 12, 2020    Stephane Vergara  [address]  [place] [facility] [address]      Dear Stephane,    I care about your health and have reviewed your health plan.  I have reviewed your medical conditions, medication list, and lab results and am making recommendations  based on this review, to better manage your health.    You are particularly in need of attention regarding:  -High Blood Pressure and -Wellness (Physical) Visit    I am recommending that you:  -schedule a NURSE-ONLY BLOOD PRESSURE APPOINTMENT within the next 1-4 weeks.  -schedule a WELLNESS (Physical) APPOINTMENT with me.   I will check fasting labs the same day - nothing to eat except water and meds for 8-10 hours prior.      Here is a list of Health Maintenance topics that are due now or due soon:  Health Maintenance Due   Topic Date Due     PREVENTIVE CARE VISIT  1985     HIV SCREENING  01/07/1999     PNEUMOCOCCAL IMMUNIZATION 19-64 MEDIUM RISK (1 of 1 - PPSV23) 01/07/2003     INFLUENZA VACCINE (1) 09/01/2019     PHQ-2  01/01/2020       Please call us at [phone] (or use [employer]) to address the above   recommendations.                          Thank you for trusting [facility] and we appreciate the opportunity to serve you.  We look forward to supporting your healthcare needs in the future.    Healthy Regards,      Nery Lyon NP/lma

## 2020-02-12 ENCOUNTER — OFFICE VISIT (OUTPATIENT)
Dept: FAMILY MEDICINE | Facility: CLINIC | Age: 35
End: 2020-02-12

## 2020-02-12 VITALS
HEART RATE: 83 BPM | WEIGHT: 198 LBS | SYSTOLIC BLOOD PRESSURE: 158 MMHG | HEIGHT: 69 IN | OXYGEN SATURATION: 97 % | BODY MASS INDEX: 29.33 KG/M2 | TEMPERATURE: 98.2 F | DIASTOLIC BLOOD PRESSURE: 110 MMHG

## 2020-02-12 DIAGNOSIS — Z76.89 HEALTH CARE HOME: ICD-10-CM

## 2020-02-12 DIAGNOSIS — I10 BENIGN ESSENTIAL HTN: Primary | ICD-10-CM

## 2020-02-12 DIAGNOSIS — M10.9 GOUT OF LEFT FOOT, UNSPECIFIED CAUSE, UNSPECIFIED CHRONICITY: ICD-10-CM

## 2020-02-12 DIAGNOSIS — Z71.89 ACP (ADVANCE CARE PLANNING): ICD-10-CM

## 2020-02-12 DIAGNOSIS — F10.10 ETOH ABUSE: ICD-10-CM

## 2020-02-12 LAB
BUN SERPL-MCNC: 17 MG/DL (ref 7–25)
BUN/CREATININE RATIO: 21.8 (ref 6–22)
CALCIUM SERPL-MCNC: 8.7 MG/DL (ref 8.6–10.3)
CHLORIDE SERPLBLD-SCNC: 103.6 MMOL/L (ref 98–110)
CO2 SERPL-SCNC: 24.6 MMOL/L (ref 20–32)
CREAT SERPL-MCNC: 0.78 MG/DL (ref 0.7–1.18)
GLUCOSE SERPL-MCNC: 93 MG/DL (ref 60–99)
POTASSIUM SERPL-SCNC: 4.39 MMOL/L (ref 3.5–5.3)
SODIUM SERPL-SCNC: 139.9 MMOL/L (ref 135–146)

## 2020-02-12 PROCEDURE — 84550 ASSAY OF BLOOD/URIC ACID: CPT | Mod: 90 | Performed by: FAMILY MEDICINE

## 2020-02-12 PROCEDURE — 80048 BASIC METABOLIC PNL TOTAL CA: CPT | Performed by: FAMILY MEDICINE

## 2020-02-12 PROCEDURE — 36415 COLL VENOUS BLD VENIPUNCTURE: CPT | Performed by: FAMILY MEDICINE

## 2020-02-12 PROCEDURE — 99202 OFFICE O/P NEW SF 15 MIN: CPT | Performed by: FAMILY MEDICINE

## 2020-02-12 RX ORDER — ALLOPURINOL 100 MG/1
100 TABLET ORAL DAILY
Qty: 90 TABLET | Refills: 0 | Status: SHIPPED | OUTPATIENT
Start: 2020-02-12 | End: 2021-01-06

## 2020-02-12 RX ORDER — LISINOPRIL 40 MG/1
40 TABLET ORAL DAILY
Qty: 90 TABLET | Refills: 0 | Status: SHIPPED | OUTPATIENT
Start: 2020-02-12 | End: 2020-10-18

## 2020-02-12 ASSESSMENT — MIFFLIN-ST. JEOR: SCORE: 1810.56

## 2020-02-12 NOTE — LETTER
February 13, 2020      Stephane CLIFTON Ursula  [address]  [facility] [address]        Dear ,    We are writing to inform you of your test results.    Your kidney tests were normal.    The uric acid was not particularly high but was over 6 and the goal with gout is to get this under 6.  I think the daily medication should help.   Resulted Orders   Basic Metabolic Panel ([facility])   Result Value Ref Range    Carbon Dioxide 24.6 20 - 32 mmol/L    Creatinine 0.78 0.70 - 1.18 mg/dL    Glucose 93 60 - 99 mg/dL    Sodium 139.9 135 - 146 mmol/L    Potassium 4.39 3.5 - 5.3 mmol/L    Chloride 103.6 98 - 110 mmol/L    Urea Nitrogen 17 7 - 25 mg/dL    Calcium 8.7 8.6 - 10.3 mg/dL    BUN/Creatinine Ratio 21.8 6 - 22   URIC ACID ([facility])   Result Value Ref Range    Uric Acid 6.1 4.0 - 8.0 mg/dL      Comment:      Therapeutic target for gout patients: <6.0 mg/dL             If you have any questions or concerns, please call the clinic at the number listed above.       Sincerely,        Brian Seay MD

## 2020-02-12 NOTE — TELEPHONE ENCOUNTER
2nd attempt,   Called patient and he stated that will call at the end of the week to schedule an appointment. Letter sent to home address.    Lydia Guerrero MA

## 2020-02-12 NOTE — PROGRESS NOTES
"SUBJECTIVE:  Stephane Vergara, a 35 year old male scheduled an appointment to discuss the following issues:     Health Care Home  ACP (advance care planning)  Benign essential HTN  Gout of left foot, unspecified cause, unspecified chronicity  ETOH abuse  PT new to clinic-states he has a hx of HTN- previously on Prinzide but ran out some months ago-last OV was 3/19.  Pt does not know if BP was controlled on Prinzide    PT also has hx gout, has been getting flares about every 6 months for a few years-mainly hands and feet-most recent 7/19 which resulted in ED visit-treated with steroids-was initially thought to be cellulitis but ortho consult suggested gout    PT has never been on gout therapy-states strong FH- also noted all family with gout have lower uric acid levels.  His was low when checked in ED. States he did have some mild flaring in hand a few weeks back again-treated with nsaids.  He would like to start prevntive therapy    PT has hx ETOH abuse but has controlled for the last year or more, states he is \"getting healthy\", eating well and exercising regularly.      Medical, social, surgical, and family histories reviewed.    Patient Active Problem List   Diagnosis     Benign essential HTN     ETOH abuse     CARDIOVASCULAR SCREENING; LDL GOAL LESS THAN 130     Cellulitis     Health Care Home     ACP (advance care planning)     Gout of left foot, unspecified cause, unspecified chronicity     Past Medical History:   Diagnosis Date     Benign essential HTN      ETOH abuse      Family History   Problem Relation Age of Onset     Cancer Paternal Grandfather         liver     Hypertension Mother      Coronary Artery Disease Maternal Grandfather      Dementia Paternal Grandmother      Diabetes No family hx of      Heart Disease No family hx of      Cerebrovascular Disease No family hx of      Colon Cancer No family hx of      Prostate Cancer No family hx of      Social History     Socioeconomic History     Marital " status:      Spouse name: Allison     Number of children: 3     Years of education: Not on file     Highest education level: Not on file   Occupational History     Occupation: [employer]   Social Needs     Financial resource strain: Not on file     Food insecurity:     Worry: Not on file     Inability: Not on file     Transportation needs:     Medical: Not on file     Non-medical: Not on file   Tobacco Use     Smoking status: Former Smoker     Types: Other     Smokeless tobacco: Former User     Types: Chew     Quit date: 3/21/2007     Tobacco comment: e-cig currently   Substance and Sexual Activity     Alcohol use: Not Currently     Alcohol/week: 0.0 standard drinks     Drug use: No     Comment: stopped marijuana jan 2005     Sexual activity: Yes     Partners: Female   Lifestyle     Physical activity:     Days per week: Not on file     Minutes per session: Not on file     Stress: Not on file   Relationships     Social connections:     Talks on phone: Not on file     Gets together: Not on file     Attends [employer] service: Not on file     Active member of club or organization: Not on file     Attends meetings of clubs or organizations: Not on file     Relationship status: Not on file     Intimate partner violence:     Fear of current or ex partner: Not on file     Emotionally abused: Not on file     Physically abused: Not on file     Forced sexual activity: Not on file   Other Topics Concern     Parent/sibling w/ CABG, MI or angioplasty before 65F 55M? Not Asked   Social History Narrative     Not on file     Past Surgical History:   Procedure Laterality Date     C NONSPECIFIC PROCEDURE      ORIF foot     HC REPAIR ING HERNIA,5+Y/O,REDUCIBL      Inguinal Hernia Repair     No current outpatient medications on file prior to visit.  No current facility-administered medications on file prior to visit.        Allergies: No known drug allergies    Immunization History   Administered Date(s) Administered      "DT (PEDS <7y) 04/14/1995, 08/07/1995     FLU 6-35 months 11/15/2012     Hep B, Peds or Adolescent 12/02/1992, 01/06/1993, 07/03/1995, 08/07/1995, 02/26/1996     HepA-ped 2 Dose 04/02/1996, 05/04/1996, 10/18/1996, 04/04/1997, 04/02/1998     HepB-Adult 06/15/1993     Hib (PRP-T) 01/13/1986     Historical DTP/aP 02/07/1986, 04/19/1989     Influenza (IIV3) PF 01/05/2013, 10/25/2016     MMR 05/10/1985, 12/02/1992, 07/03/1995, 04/02/1996     Mantoux Tuberculin Skin Test 08/14/2002     OPV, trivalent, live 02/07/1986, 04/19/1989     TD (ADULT, 7+) 04/16/2005     TDAP Vaccine (Adacel) 07/13/2016     Td (Adult), Adsorbed 03/02/2001, 04/16/2005        ROS:  CONSTITUTIONAL: NEGATIVE for fever, chills  EYES: NEGATIVE for vision changes   RESP: NEGATIVE for significant cough or SOB  CV: NEGATIVE for chest pain, palpitations   GI: NEGATIVE for nausea, abdominal pain, heartburn, or change in bowel habits  : NEGATIVE for frequency, dysuria, or hematuria  NEURO: NEGATIVE for weakness, dizziness or paresthesias or headache    OBJECTIVE:  BP (!) 158/110 (BP Location: Right arm, Patient Position: Sitting, Cuff Size: Adult Large)   Pulse 83   Temp 98.2  F (36.8  C) (Oral)   Ht 1.74 m (5' 8.5\")   Wt 89.8 kg (198 lb)   SpO2 97%   BMI 29.67 kg/m    EXAM:  GENERAL APPEARANCE: healthy, alert and no distress  EYES: EOMI,  PERRL  HENT: ear canals and TM's normal and nose and mouth without ulcers or lesions  RESP: lungs clear to auscultation - no rales, rhonchi or wheezes  CV: regular rates and rhythm, normal S1 S2, no S3 or S4 and no murmur, click or rub -  ABDOMEN:  soft, nontender, no HSM or masses and bowel sounds normal  MS: extremities normal- no gross deformities noted, no evidence of inflammation in joints, FROM in all extremities.    ASSESSMENT/PLAN:  (I10) Benign essential HTN  (primary encounter diagnosis)  Comment: not controlled, seemingly noncompliant with meds-given gout issues will get him off thiazide diuretic  Plan: " lisinopril (PRINIVIL/ZESTRIL) 40 MG tablet,         Basic Metabolic Panel (BFP), VENOUS COLLECTION        Start back on 40 mg lisinopril, I reviewed the risks, benefits, and possible side effects of the medication.  The patient had an opportunity to ask any questions regarding the treatment plan. The patient was encouraged to call my office if any problems. Check blood pressure readings outside of the clinic several times per week, write down values, and follow up if elevated within the next several weeks. Blood pressure can be checked at the firestation, drugstore,  or any valid site.     Suspect he will need therapy intensification    (Z76.89) Health Care Home  Comment:   Plan:     (Z71.89) ACP (advance care planning)  Comment:   Plan:     (M10.9) Gout of left foot, unspecified cause, unspecified chronicity  Comment: recurrent gout, strong FH- suspect allopurinol a good option here but may need to go more by symptoms then uric acid levels given previous lows in he and family with gout  Plan: allopurinol (ZYLOPRIM) 100 MG tablet, URIC ACID        (QUEST), VENOUS COLLECTION        Will start med, discussed possible flaring on initiation-pt has indomethacin at home-can use if flares    (F10.10) ETOH abuse  Comment: controlled  Plan: encourage abstinence    Recheck 3 mo

## 2020-02-13 LAB — URATE SERPL-MCNC: 6.1 MG/DL (ref 4–8)

## 2020-06-06 DIAGNOSIS — I10 BENIGN ESSENTIAL HTN: ICD-10-CM

## 2020-06-08 RX ORDER — LISINOPRIL 40 MG/1
TABLET ORAL
Qty: 90 TABLET | Refills: 0 | COMMUNITY
Start: 2020-06-08

## 2020-06-08 NOTE — TELEPHONE ENCOUNTER
Stephane Vergara is requesting a refill of:    Refused Prescriptions:                       Disp   Refills    lisinopril (ZESTRIL) 40 MG tablet [Pharmac*90 tab*0        Sig: TAKE 1 TABLET BY MOUTH EVERY DAY  Refused By: ATIF RAMIREZ  Reason for Refusal: Patient needs appointment      Pt last seen 02/12/20 advised to return in 3 months, due for OV

## 2020-10-18 ENCOUNTER — HOSPITAL ENCOUNTER (EMERGENCY)
Facility: CLINIC | Age: 35
Discharge: HOME | End: 2020-10-18
Attending: EMERGENCY MEDICINE | Admitting: EMERGENCY MEDICINE
Payer: COMMERCIAL

## 2020-10-18 VITALS
DIASTOLIC BLOOD PRESSURE: 110 MMHG | BODY MASS INDEX: 29.22 KG/M2 | OXYGEN SATURATION: 97 % | TEMPERATURE: 97.8 F | HEART RATE: 89 BPM | SYSTOLIC BLOOD PRESSURE: 137 MMHG | WEIGHT: 195 LBS

## 2020-10-18 DIAGNOSIS — I10 BENIGN ESSENTIAL HYPERTENSION: ICD-10-CM

## 2020-10-18 DIAGNOSIS — S01.81XA LACERATION OF FOREHEAD, INITIAL ENCOUNTER: ICD-10-CM

## 2020-10-18 PROCEDURE — 250N000009 HC RX 250: Performed by: EMERGENCY MEDICINE

## 2020-10-18 PROCEDURE — 250N000013 HC RX MED GY IP 250 OP 250 PS 637: Performed by: EMERGENCY MEDICINE

## 2020-10-18 PROCEDURE — 93005 ELECTROCARDIOGRAM TRACING: CPT

## 2020-10-18 PROCEDURE — 99283 EMERGENCY DEPT VISIT LOW MDM: CPT | Mod: 25

## 2020-10-18 PROCEDURE — 12013 RPR F/E/E/N/L/M 2.6-5.0 CM: CPT

## 2020-10-18 RX ORDER — LISINOPRIL 40 MG/1
40 TABLET ORAL DAILY
Qty: 30 TABLET | Refills: 0 | Status: SHIPPED | OUTPATIENT
Start: 2020-10-19 | End: 2020-10-23

## 2020-10-18 RX ORDER — LISINOPRIL 10 MG/1
40 TABLET ORAL ONCE
Status: COMPLETED | OUTPATIENT
Start: 2020-10-18 | End: 2020-10-18

## 2020-10-18 RX ORDER — LIDOCAINE HYDROCHLORIDE AND EPINEPHRINE 10; 10 MG/ML; UG/ML
1 INJECTION, SOLUTION INFILTRATION; PERINEURAL ONCE
Status: COMPLETED | OUTPATIENT
Start: 2020-10-18 | End: 2020-10-18

## 2020-10-18 RX ADMIN — LIDOCAINE HYDROCHLORIDE AND EPINEPHRINE 1 ML: 10; 10 INJECTION, SOLUTION INFILTRATION; PERINEURAL at 12:30

## 2020-10-18 RX ADMIN — LISINOPRIL 40 MG: 10 TABLET ORAL at 12:28

## 2020-10-18 ASSESSMENT — ENCOUNTER SYMPTOMS
SHORTNESS OF BREATH: 0
WOUND: 1
DIZZINESS: 1
LIGHT-HEADEDNESS: 0

## 2020-10-18 NOTE — ED PROVIDER NOTES
History     Chief Complaint:  Head injury     HPI   Stephane Vergara is a 35 year old male who presents with head injury. The patient hit his right forehead on a chicken coop when he bent over this morning. The patient reports that he was feeling normal prior to hitting his head but felt dizzy after. He also sustained a laceration to the right forehead. He denies any light headedness, chest pain, or shortness of breath.     Allergies:  No Known Drug Allergies     Medications:    Allopurinol   Lisinopril     Past Medical History:    Hypertension   ETOH abuse     Past Surgical History:    Inguinal hernia repair   ORIF foot     Family History:    Hypertension     Social History:  Smoking Status: former  Smokeless Tobacco: former chew  Alcohol Use: yes, everyday  Drug Use: No  PCP: Brian Seay     Review of Systems   Respiratory: Negative for shortness of breath.    Cardiovascular: Negative for chest pain.   Skin: Positive for wound (laceration right forehead).   Neurological: Positive for dizziness. Negative for light-headedness.   All other systems reviewed and are negative.      Physical Exam     Patient Vitals for the past 24 hrs:   BP Temp Temp src Pulse SpO2 Weight   10/18/20 1343 (!) 137/110 -- -- -- -- --   10/18/20 1142 (!) 182/137 97.8  F (36.6  C) Temporal 89 97 % 88.5 kg (195 lb)        Physical Exam  Constitutional: Alert, attentive, GCS 15  HENT:    Head: 4 cm linear right forehead laceration    Nose: Nose normal.    Mouth/Throat: Oropharynx is clear, mucous membranes are moist.   Neck: no midline tenderness. Full range of motion    Eyes: EOM are normal, anicteric, conjugate gaze  CV: regular rate and rhythm; no murmurs  Chest: Effort normal and breath sounds clear without wheezing or rales, symmetric bilaterally   GI:  non tender. No distension. No guarding or rebound.    MSK: No LE edema, no tenderness to palpation of BLE.  Neurological: Alert, attentive, moving all extremities equally.    Skin: Skin is warm and dry.     Emergency Department Course   ECG:  ECG taken at 1151  Normal sinus rhythm  Normal ECG  Rate 83 bpm. RI interval 170 ms. QRS duration 94 ms. QT/QTc 376/441 ms. P-R-T axes 38 16 3.     Procedures     Laceration Repair        LACERATION:  A simple and superficial clean 4 cm laceration.      LOCATION:  Right forehead      FUNCTION:  Distally sensation and circulation are intact.      ANESTHESIA:  Local using lidocaine 1% w/Epi total of 1 mLs      PREPARATION:  Irrigation and Scrubbing with Shur Clens      DEBRIDEMENT:  no debridement      CLOSURE:  Wound was closed with One Layer.  Skin closed with 9 x 5.0 Ethylon using running sutures.       Interventions:  1228 Lisinopril 40 mg oral     Emergency Department Course:  Past medical records, nursing notes, and vitals reviewed.    1203 I performed an exam of the patient as documented above.       1230 Patient rechecked and updated.      1325 The patient's laceration is repaired as noted above.      Findings and plan explained to the Patient. Patient discharged home with instructions regarding supportive care, medications, and reasons to return. The importance of close follow-up was reviewed. The patient was prescribed lisinopril       Impression & Plan     Medical Decision Making:  Stephane Vergara is a 35 year old male with past medical history significant for hypertension and has been off of his lisinopril noted to be significantly hypertensive here in the emergency department though he also does endorse anxiousness while being here presenting only for evaluation of right forehead laceration. This was sustained while bending over a chicken coop. He endorse some mild dizziness afterwards. No headache, vision changes, or difficulty walking. He is low risk by Nexus criteria, no indication for CT imaging. Laceration repaired as above. He does have a history of recurrent concussions and I suspect mild concussion. He was given a dose of his  oral lisinopril, repeat blood pressure check shows 137/110.  He reports no HA, dizziness, chest pain or pressure.  Wound care reviewed and I recommended follow up with PCP for suture removal in 6 days.    Discharge Diagnosis:    ICD-10-CM    1. Benign essential hypertension  I10    2. Laceration of forehead, initial encounter  S01.81XA        Disposition:  Discharged to home.    Discharge Medications:  - Lisinopril 40mg daily. (restart)    Cj Hayward MD  [employer]  6:21 PM 10/18/20     Scribe Disclosure:  I, Phil Malena, am serving as a scribe at 12:03 PM on 10/18/2020 to document services personally performed by Cj Hayward MD based on my observations and the provider's statements to me.      10/18/2020   Cj Hayward MD Dunbar, John Forrest, MD  10/18/20 182

## 2020-10-18 NOTE — DISCHARGE INSTRUCTIONS
You must make an appointment to follow-up with your primary doctor for recheck of your blood pressure sometime this week.  You should also start taking your lisinopril daily as ordered.  You should return the emergency room should you have severe headache, chest pain, chest pressure or worsening dizziness, lightheadedness.  He may have a mild concussion as well from hitting your head and I recommend following up with your primary for this.  Your stitches should come out in 6 to 7 days.  You should not put your head underwater for any reason though showering is okay.    Discharge Instructions  Laceration (Cut)    You were seen today for a laceration (cut).  Your provider examined your laceration for any problems such a buried foreign body (like glass, a splinter, or gravel), or injury to blood vessels, tendons, and nerves.  Your provider may have also rinsed and/or scrubbed your laceration to help prevent an infection. It may not be possible to find all problems with your laceration on the first visit; occasionally foreign bodies or a tendon injury can go undetected.    Your laceration may have been closed in one of several ways:  No closure: many wounds will heal just fine without closure.  Stitches: regular stitches that require removal.  Staples: skin staples are often used in the scalp/head.  Wound adhesive (glue): skin glue can be used for certain lacerations and doesn t require removal.  Wound strips (aka Butterfly bandages or steri-strips): these are bandages that help to close a wound.  Absorbable stitches:  dissolving  stitches that go away on their own and usually don t require removal.    A small percentage of wounds will develop an infection regardless of how well the wound is cared for. Antibiotics are generally not indicated to prevent an infection so are only given for a small number of high-risk wounds. Some lacerations are too high risk to close, and are left open to heal because closure can increase  the likelihood that an infection will develop.    Remember that all lacerations, no matter how expertly repaired, will cause scarring. We consider many factors, techniques, and materials, in our efforts to provide the best possible cosmetic outcome.    Generally, every Emergency Department visit should have a follow-up clinic visit with either a primary or a specialty clinic/provider. Please follow-up as instructed by your emergency provider today.     Return to the Emergency Department right away if:  You have more redness, swelling, pain, drainage (pus), a bad smell, or red streaking from your laceration as these symptoms could indicate an infection.  You have a fever of 100.4 F or more.  You have bleeding that you cannot stop at home. If your cut starts to bleed, hold pressure on the bleeding area with a clean cloth or put pressure over the bandage.  If the bleeding does not stop after using constant pressure for 30 minutes, you should return to the Emergency Department for further treatment.  An area past the laceration is cool, pale, or blue compared with the other side, or has a slower return of color when squeezed.  Your dressing seems too tight or starts to get uncomfortable or painful. For children, signs of a problem might be irritability or restlessness.  You have loss of normal function or use of an area, such as being unable to straighten or bend a finger normally.  You have a numb area past the laceration.    Return to the Emergency Department or see your regular provider if:  The laceration starts to come open.   You have something coming out of the cut or a feeling that there is something in the laceration.  Your wound will not heal, or keeps breaking open. There can always be glass, wood, dirt or other things in any wound.  They will not always show up, even on x-rays.  If a wound does not heal, this may be why, and it is important to follow-up with your regular provider.    Home Care:  Take your  dressing off in 12-24 hours, or as instructed by your provider, to check your laceration. Remove the dressing sooner if it seems too tight or painful, or if it is getting numb, tingly, or pale past the dressing.  Gently wash your laceration 1-2 times daily with clean water and mild soap. It is okay to shower or run clean water over the laceration, but do not let the laceration soak in water (no swimming).  If your laceration was closed with wound adhesive or strips: pat it dry and leave it open to the air. For all other repairs: after you wash your laceration, or at least 2 times a day, apply antibiotic ointment (such as Neosporin  or Bacitracin ) to the laceration, then cover it with a Band-Aid  or gauze.  Keep the laceration clean. Wear gloves or other protective clothing if you are around dirt.    Follow-up for removal:  If your wound was closed with staples or regular stitches, they need to be removed according to the instructions and timeline specified by your provider today.  If your wound was closed with absorbable ( dissolving ) sutures, they should fall out, dissolve, or not be visible in about one week. If they are still visible, then they should be removed according to the instructions and timeline specified by your provider today.    Scars:  To help minimize scarring:  Wear sunscreen over the healed laceration when out in the sun.  Massage the area regularly once healed.  You may apply Vitamin E to the healed wound.  Wait. Scars improve in appearance over months and years.    If you were given a prescription for medicine here today, be sure to read all of the information (including the package insert) that comes with your prescription.  This will include important information about the medicine, its side effects, and any warnings that you need to know about.  The pharmacist who fills the prescription can provide more information and answer questions you may have about the medicine.  If you have questions  or concerns that the pharmacist cannot address, please call or return to the Emergency Department.       Remember that you can always come back to the Emergency Department if you are not able to see your regular provider in the amount of time listed above, if you get any new symptoms, or if there is anything that worries you.

## 2020-10-18 NOTE — ED TRIAGE NOTES
Pt was struck head while in chicken coop and has laceration to right forehead.  He is feeling dizzy.

## 2020-10-18 NOTE — ED AVS SNAPSHOT
[facility] Emergency [facility]  [address]  [facility] [address]  Phone: [phone]  Fax: [phone]                                    Stephane Vergara   MRN: [id number]    Department: [facility] Emergency Dept   Date of Visit: 10/18/2020           After Visit Summary Signature Page    I have received my discharge instructions, and my questions have been answered. I have discussed any challenges I see with this plan with the nurse or doctor.    ..........................................................................................................................................  Patient/Patient Representative Signature      ..........................................................................................................................................  Patient Representative Print Name and Relationship to Patient    ..................................................               ................................................  Date                                   Time    ..........................................................................................................................................  Reviewed by Signature/Title    ...................................................              ..............................................  Date                                               Time          22EPIC Rev 08/18

## 2020-10-19 LAB — INTERPRETATION ECG - MUSE: NORMAL

## 2020-10-23 ENCOUNTER — OFFICE VISIT (OUTPATIENT)
Dept: FAMILY MEDICINE | Facility: CLINIC | Age: 35
End: 2020-10-23

## 2020-10-23 VITALS
WEIGHT: 195 LBS | TEMPERATURE: 98.9 F | SYSTOLIC BLOOD PRESSURE: 158 MMHG | OXYGEN SATURATION: 98 % | HEIGHT: 69 IN | HEART RATE: 94 BPM | BODY MASS INDEX: 28.88 KG/M2 | DIASTOLIC BLOOD PRESSURE: 100 MMHG

## 2020-10-23 DIAGNOSIS — F41.1 GENERALIZED ANXIETY DISORDER: Primary | ICD-10-CM

## 2020-10-23 DIAGNOSIS — Z48.02 VISIT FOR SUTURE REMOVAL: ICD-10-CM

## 2020-10-23 DIAGNOSIS — I10 BENIGN ESSENTIAL HTN: ICD-10-CM

## 2020-10-23 PROCEDURE — 90686 IIV4 VACC NO PRSV 0.5 ML IM: CPT | Performed by: PHYSICIAN ASSISTANT

## 2020-10-23 PROCEDURE — 99214 OFFICE O/P EST MOD 30 MIN: CPT | Mod: 25 | Performed by: PHYSICIAN ASSISTANT

## 2020-10-23 PROCEDURE — 90471 IMMUNIZATION ADMIN: CPT | Performed by: PHYSICIAN ASSISTANT

## 2020-10-23 RX ORDER — LISINOPRIL 40 MG/1
40 TABLET ORAL DAILY
Qty: 30 TABLET | Refills: 0 | Status: SHIPPED | OUTPATIENT
Start: 2020-10-23 | End: 2021-01-06

## 2020-10-23 SDOH — HEALTH STABILITY: MENTAL HEALTH: HOW MANY STANDARD DRINKS CONTAINING ALCOHOL DO YOU HAVE ON A TYPICAL DAY?: 1 OR 2

## 2020-10-23 SDOH — HEALTH STABILITY: MENTAL HEALTH: HOW OFTEN DO YOU HAVE 6 OR MORE DRINKS ON ONE OCCASION?: LESS THAN MONTHLY

## 2020-10-23 SDOH — HEALTH STABILITY: MENTAL HEALTH: HOW OFTEN DO YOU HAVE A DRINK CONTAINING ALCOHOL?: 4 OR MORE TIMES A WEEK

## 2020-10-23 ASSESSMENT — ANXIETY QUESTIONNAIRES
IF YOU CHECKED OFF ANY PROBLEMS ON THIS QUESTIONNAIRE, HOW DIFFICULT HAVE THESE PROBLEMS MADE IT FOR YOU TO DO YOUR WORK, TAKE CARE OF THINGS AT HOME, OR GET ALONG WITH OTHER PEOPLE: SOMEWHAT DIFFICULT
1. FEELING NERVOUS, ANXIOUS, OR ON EDGE: SEVERAL DAYS
5. BEING SO RESTLESS THAT IT IS HARD TO SIT STILL: SEVERAL DAYS
6. BECOMING EASILY ANNOYED OR IRRITABLE: SEVERAL DAYS
7. FEELING AFRAID AS IF SOMETHING AWFUL MIGHT HAPPEN: SEVERAL DAYS
3. WORRYING TOO MUCH ABOUT DIFFERENT THINGS: MORE THAN HALF THE DAYS
2. NOT BEING ABLE TO STOP OR CONTROL WORRYING: SEVERAL DAYS
GAD7 TOTAL SCORE: 9

## 2020-10-23 ASSESSMENT — MIFFLIN-ST. JEOR: SCORE: 1796.95

## 2020-10-23 ASSESSMENT — PATIENT HEALTH QUESTIONNAIRE - PHQ9
SUM OF ALL RESPONSES TO PHQ QUESTIONS 1-9: 3
5. POOR APPETITE OR OVEREATING: MORE THAN HALF THE DAYS

## 2020-10-23 NOTE — NURSING NOTE
Stephane is here for suture removal on his forehead.        Pre-visit Screening:  Immunizations:  up to date  Colonoscopy:  NA  Mammogram: NA  Asthma Action Test/Plan:  NA  PHQ9:  None  GAD7:  None  Questioned patient about current smoking habits Pt. Currently smokes an e-cig  Ok to leave detailed message on voice mail for today's visit only Yes, phone # [phone]

## 2020-10-23 NOTE — PROGRESS NOTES
"CC: Suture Repair, recheck concussion symptoms, recheck BP, new dx anxiety    History:  Sutures:  Right forehead. Sutured closed in ER. No complications.     HTN:  In ER, his BP was elevated as he was off lisinopril. Has restarted on lisinopril 40 mg as of yesterday. Was prescribed 10/18/2020 in ER, but couldn't  earlier due to severe dizzy spells mainly just staying in bed. No chest pain, palpitations, SOB. Recommended BMP today, but pt urgently needs to leave office, but will do blood work at f/u appt.      Concussion symptoms:  Dizziness seems to be improving, and pt feels is resolved at this point. Still having some numbness on right side of top of head. Did have headache at first, but this has been resolved for 2 days. Has been having some fatigue even after sleeping, but this is resolving.     Anxiety:  Stephane admits that he has struggled with anxiety his whole life. Has 3 kids, stressful job. He is not interested in seeing a therapist, but would consider starting a medication. He feels that anxiety symptoms of constant worrying, not being able to relax, not being able to concentrate have all been getting worse and worse. Denies any significant panic attacks.     PMH, MEDICATIONS, ALLERGIES, SOCIAL AND FAMILY HISTORY in [facility] and reviewed by me personally.    ROS negative other than the symptoms noted above in the HPI.     Examination   BP (!) 158/100 (BP Location: Left arm, Patient Position: Sitting)   Pulse 94   Temp 98.9  F (37.2  C) (Oral)   Ht 1.74 m (5' 8.5\")   Wt 88.5 kg (195 lb)   SpO2 98%   BMI 29.22 kg/m       Constitutional: Sitting comfortably, in no acute distress. Vital signs noted  Eyes: pupils equal round reactive to light and accomodation, extra ocular movements intact  Neck:  no adenopathy, trachea midline and normal to palpation  Cardiovascular:  regular rate and rhythm, no murmurs, clicks, or gallops  Respiratory:  normal respiratory rate and rhythm, lungs clear to " auscultation  SKIN: No jaundice/pallor. 3 cm laceration on right forehead. Well adhered.   Psychiatric: mentation appears normal and affect normal/bright    A/P    ICD-10-CM    1. Generalized anxiety disorder  F41.1 sertraline (ZOLOFT) 50 MG tablet   2. Benign essential HTN  I10 lisinopril (ZESTRIL) 40 MG tablet     CANCELED: VENOUS COLLECTION     CANCELED: Basic Metabolic Panel (BFP)     DISCUSSION:  Suture removal:  9 sutures removed successfully. Steri strips applied for reinforcement. No evidence of dehiscence, erythema, purulent discharge, edema. Reassured that concussion symptoms seem to be resolved.     HTN: BP remains quite elevated, but suspect due in part to anxiety, and only restarted lisinopril yesterday. Will allow his body to adjust to being back on lisinopril and see if anxiolytic trial offers additional improvement.     Anxiety: Spent majority of appt discussing anxiety. Neither PHQ or GEOVANY today show significant elevation, but GEOVANY is 9. Given that he is not interested in therapy, and that is experiencing this worse and worse on a chronic basis, agreed to trial of sertraline. AE of drugs of the SSRI class advised including weight gain, sexual dysfunction, insomnia, headache, nausea. Rarely suicidal ideation may occur. These medications are generally effective at alleviating symptoms of anxiety and/or depression. Pt to report any AE. Also warned pt to avoid consumption of alcohol while taking this medication. Return in 2 months to recheck, or contact me sooner if concerns.       follow up visit: 2 months    Time of visit: 25 minutes    Roxanna Andrews PA-C  [facility]

## 2020-10-23 NOTE — PATIENT INSTRUCTIONS
Blood pressure still elevated, but only back on lisinopril yesterday. Asked him to send me BP levels in 2 weeks to ensure improving. Goal is <130/80.    For anxiety, agreed to do trial of sertraline 50 mg daily. Start ideally on day off once been on lisinopril at least 1 week and concussion symptoms have resolved. Start with 1/2 tablet for 3-5 days, and if doing well increase to full tablet. Take with food, AM or PM. Return in 1-2 months to recheck.  Side effects would be headaches, sweats, weight changes, sexual side effects, diarrhea, upset stomach. Avoid alcohol while taking. Contact me if noted. Goal would be to control anxiety and then be able to wean off of nicotine products.

## 2020-10-24 ASSESSMENT — ANXIETY QUESTIONNAIRES: GAD7 TOTAL SCORE: 9

## 2020-10-26 PROBLEM — F41.1 GENERALIZED ANXIETY DISORDER: Status: ACTIVE | Noted: 2020-10-26

## 2021-01-06 ENCOUNTER — OFFICE VISIT (OUTPATIENT)
Dept: FAMILY MEDICINE | Facility: CLINIC | Age: 36
End: 2021-01-06

## 2021-01-06 VITALS
WEIGHT: 198.6 LBS | OXYGEN SATURATION: 97 % | HEART RATE: 86 BPM | TEMPERATURE: 98.7 F | HEIGHT: 69 IN | SYSTOLIC BLOOD PRESSURE: 154 MMHG | DIASTOLIC BLOOD PRESSURE: 100 MMHG | BODY MASS INDEX: 29.41 KG/M2

## 2021-01-06 DIAGNOSIS — F41.1 GENERALIZED ANXIETY DISORDER: ICD-10-CM

## 2021-01-06 DIAGNOSIS — Z87.891 PERSONAL HISTORY OF TOBACCO USE, PRESENTING HAZARDS TO HEALTH: ICD-10-CM

## 2021-01-06 DIAGNOSIS — M10.9 GOUT OF LEFT FOOT, UNSPECIFIED CAUSE, UNSPECIFIED CHRONICITY: ICD-10-CM

## 2021-01-06 DIAGNOSIS — I10 BENIGN ESSENTIAL HTN: ICD-10-CM

## 2021-01-06 PROCEDURE — 99214 OFFICE O/P EST MOD 30 MIN: CPT | Performed by: FAMILY MEDICINE

## 2021-01-06 RX ORDER — LISINOPRIL 40 MG/1
40 TABLET ORAL DAILY
Qty: 90 TABLET | Refills: 1 | Status: SHIPPED | OUTPATIENT
Start: 2021-01-06

## 2021-01-06 RX ORDER — HYDROCHLOROTHIAZIDE 25 MG/1
25 TABLET ORAL DAILY
Qty: 90 TABLET | Refills: 1 | Status: SHIPPED | OUTPATIENT
Start: 2021-01-06

## 2021-01-06 RX ORDER — ALLOPURINOL 100 MG/1
100 TABLET ORAL DAILY
Qty: 90 TABLET | Refills: 1 | Status: SHIPPED | OUTPATIENT
Start: 2021-01-06

## 2021-01-06 ASSESSMENT — MIFFLIN-ST. JEOR: SCORE: 1813.28

## 2021-01-06 ASSESSMENT — ANXIETY QUESTIONNAIRES
GAD7 TOTAL SCORE: 3
5. BEING SO RESTLESS THAT IT IS HARD TO SIT STILL: SEVERAL DAYS
1. FEELING NERVOUS, ANXIOUS, OR ON EDGE: NOT AT ALL
6. BECOMING EASILY ANNOYED OR IRRITABLE: NOT AT ALL
IF YOU CHECKED OFF ANY PROBLEMS ON THIS QUESTIONNAIRE, HOW DIFFICULT HAVE THESE PROBLEMS MADE IT FOR YOU TO DO YOUR WORK, TAKE CARE OF THINGS AT HOME, OR GET ALONG WITH OTHER PEOPLE: NOT DIFFICULT AT ALL
2. NOT BEING ABLE TO STOP OR CONTROL WORRYING: NOT AT ALL
3. WORRYING TOO MUCH ABOUT DIFFERENT THINGS: SEVERAL DAYS
7. FEELING AFRAID AS IF SOMETHING AWFUL MIGHT HAPPEN: NOT AT ALL

## 2021-01-06 ASSESSMENT — PATIENT HEALTH QUESTIONNAIRE - PHQ9
SUM OF ALL RESPONSES TO PHQ QUESTIONS 1-9: 2
5. POOR APPETITE OR OVEREATING: SEVERAL DAYS

## 2021-01-06 NOTE — NURSING NOTE
Stephane is here for elevated BP    Pre-visit Screening:  Immunizations:  up to date  Colonoscopy:  NA  Mammogram: NA  Asthma Action Test/Plan:  NA  PHQ9:  Done today  GAD7:  Done today  Questioned patient about current smoking habits Pt. currently smokes.  Advised about smoking cessation.  Ok to leave detailed message on voice mail for today's visit only Yes, phone # [phone]

## 2021-01-06 NOTE — PROGRESS NOTES
"Assessment & Plan   Problem List Items Addressed This Visit        Endocrine    Gout of left foot, unspecified cause, unspecified chronicity    Relevant Medications    allopurinol (ZYLOPRIM) 100 MG tablet    Other Relevant Orders    URIC ACID ([facility])       Circulatory    Benign essential HTN    Relevant Medications    lisinopril (ZESTRIL) 40 MG tablet    hydrochlorothiazide (HYDRODIURIL) 25 MG tablet    Other Relevant Orders    Basic Metabolic Panel (BFP)       Behavioral    Generalized anxiety disorder    Personal history of tobacco use, presenting hazards to health         1. Generalized anxiety disorder  He has just started the sertraline. Continue and let me know if any further questions.    2. Benign essential HTN  Not controlled. Add back hydrochlorothiazide, check blood pressures at home, goal is less than 130/80, call if persistently higher.  followup to recheck labs.    - lisinopril (ZESTRIL) 40 MG tablet; Take 1 tablet (40 mg) by mouth daily  Dispense: 90 tablet; Refill: 1  - hydrochlorothiazide (HYDRODIURIL) 25 MG tablet; Take 1 tablet (25 mg) by mouth daily  Dispense: 90 tablet; Refill: 1    3. Gout of left foot, unspecified cause, unspecified chronicity  Controlled on allopurinol.  - allopurinol (ZYLOPRIM) 100 MG tablet; Take 1 tablet (100 mg) by mouth daily  Dispense: 90 tablet; Refill: 1      4. Personal history of tobacco use, presenting hazards to health  Advised to quit.        [id number]}     Tobacco Cessation:   reports that he has been smoking other. His smokeless tobacco use includes chew.  Tobacco Cessation Action Plan: Information offered: Patient not interested at this time    BMI:   Estimated body mass index is 29.76 kg/m  as calculated from the following:    Height as of this encounter: 1.74 m (5' 8.5\").    Weight as of this encounter: 90.1 kg (198 lb 9.6 oz).         followup to recheck in 6 months        Ameena Mccarthy MD  [facility]    Subjective     Stephane Vergara is a " "35 year old male who presents to clinic today for the following health issues     Here to followup on his blood pressure. Has been on medications and doesn't check blood pressures at home. Has had htn for years.   Also needs refill on his gout medications.  And recently started on medications for anxiety, has only been on this for a week but hasn' t noticed any improvements yet. No   Uses e cigarettes, doesn't want to use chantix, a friend had a bad experience with this.  Has tried the gum with mixed results.    HPI         Review of Systems   Constitutional, HEENT, cardiovascular, pulmonary, gi and gu systems are negative, except as otherwise noted.      Objective    BP (!) 154/100 (BP Location: Right arm, Patient Position: Sitting, Cuff Size: Adult Large)   Pulse 86   Temp 98.7  F (37.1  C) (Oral)   Ht 1.74 m (5' 8.5\")   Wt 90.1 kg (198 lb 9.6 oz)   SpO2 97%   BMI 29.76 kg/m    Body mass index is 29.76 kg/m .  Physical Exam   GENERAL: healthy, alert and no distress  RESP: lungs clear to auscultation - no rales, rhonchi or wheezes  CV: regular rate and rhythm, normal S1 S2, no S3 or S4, no murmur, click or rub, no peripheral edema and peripheral pulses strong  ABDOMEN: soft, nontender, no hepatosplenomegaly, no masses and bowel sounds normal  MS: no gross musculoskeletal defects noted, no edema            "

## 2021-01-07 ASSESSMENT — ANXIETY QUESTIONNAIRES: GAD7 TOTAL SCORE: 3

## 2021-01-11 ENCOUNTER — TELEPHONE (OUTPATIENT)
Dept: FAMILY MEDICINE | Facility: CLINIC | Age: 36
End: 2021-01-11

## 2021-01-11 NOTE — TELEPHONE ENCOUNTER
Spoke with pt he started his hydrochlorothiazide and is now having dizziness and lightheaded. He is wondering if this is normal and/or may take time to reside.    Thanks, Luly

## 2021-01-11 NOTE — TELEPHONE ENCOUNTER
Stephane called as he saw you last week and would like to discuss the side effects the new BP med he may be having.    Patient's phone #[phone]

## 2021-01-11 NOTE — TELEPHONE ENCOUNTER
Call him--he can take 1/2 pill of the hydrochlorothiazide. If still having the side effects stop it altogether and check home blood pressures. Let me know if continues to be persistently higher than 130/80.

## 2021-04-07 ENCOUNTER — APPOINTMENT (OUTPATIENT)
Dept: MRI IMAGING | Facility: CLINIC | Age: 36
End: 2021-04-07
Attending: EMERGENCY MEDICINE
Payer: COMMERCIAL

## 2021-04-07 ENCOUNTER — HOSPITAL ENCOUNTER (EMERGENCY)
Facility: CLINIC | Age: 36
Discharge: HOME | End: 2021-04-07
Attending: EMERGENCY MEDICINE | Admitting: EMERGENCY MEDICINE
Payer: COMMERCIAL

## 2021-04-07 VITALS
DIASTOLIC BLOOD PRESSURE: 107 MMHG | HEIGHT: 68 IN | RESPIRATION RATE: 16 BRPM | TEMPERATURE: 98.4 F | WEIGHT: 185 LBS | OXYGEN SATURATION: 92 % | HEART RATE: 84 BPM | BODY MASS INDEX: 28.04 KG/M2 | SYSTOLIC BLOOD PRESSURE: 144 MMHG

## 2021-04-07 DIAGNOSIS — F10.929 ACUTE ALCOHOLIC INTOXICATION WITH COMPLICATION (H): ICD-10-CM

## 2021-04-07 LAB
ALBUMIN SERPL-MCNC: 3.6 G/DL (ref 3.4–5)
ALP SERPL-CCNC: 59 U/L (ref 40–150)
ALT SERPL W P-5'-P-CCNC: 146 U/L (ref 0–70)
AMMONIA PLAS-SCNC: 35 UMOL/L (ref 10–50)
ANION GAP SERPL CALCULATED.3IONS-SCNC: 8 MMOL/L (ref 3–14)
AST SERPL W P-5'-P-CCNC: 219 U/L (ref 0–45)
BASE EXCESS BLDV CALC-SCNC: 1.4 MMOL/L
BASOPHILS # BLD AUTO: 0.1 10E9/L (ref 0–0.2)
BASOPHILS NFR BLD AUTO: 1.1 %
BILIRUB SERPL-MCNC: 0.4 MG/DL (ref 0.2–1.3)
BUN SERPL-MCNC: 13 MG/DL (ref 7–30)
CALCIUM SERPL-MCNC: 8.5 MG/DL (ref 8.5–10.1)
CHLORIDE SERPL-SCNC: 104 MMOL/L (ref 94–109)
CO2 SERPL-SCNC: 27 MMOL/L (ref 20–32)
CREAT SERPL-MCNC: 0.78 MG/DL (ref 0.66–1.25)
DIFFERENTIAL METHOD BLD: ABNORMAL
EOSINOPHIL # BLD AUTO: 0 10E9/L (ref 0–0.7)
EOSINOPHIL NFR BLD AUTO: 0.2 %
ERYTHROCYTE [DISTWIDTH] IN BLOOD BY AUTOMATED COUNT: 13.9 % (ref 10–15)
ETHANOL SERPL-MCNC: 0.4 G/DL
GFR SERPL CREATININE-BSD FRML MDRD: >90 ML/MIN/{1.73_M2}
GLUCOSE BLDC GLUCOMTR-MCNC: 91 MG/DL (ref 70–99)
GLUCOSE SERPL-MCNC: 89 MG/DL (ref 70–99)
HCO3 BLDV-SCNC: 26 MMOL/L (ref 21–28)
HCT VFR BLD AUTO: 41.8 % (ref 40–53)
HGB BLD-MCNC: 14.5 G/DL (ref 13.3–17.7)
IMM GRANULOCYTES # BLD: 0 10E9/L (ref 0–0.4)
IMM GRANULOCYTES NFR BLD: 0.4 %
LYMPHOCYTES # BLD AUTO: 2.2 10E9/L (ref 0.8–5.3)
LYMPHOCYTES NFR BLD AUTO: 38.5 %
MAGNESIUM SERPL-MCNC: 2.5 MG/DL (ref 1.6–2.3)
MCH RBC QN AUTO: 34.3 PG (ref 26.5–33)
MCHC RBC AUTO-ENTMCNC: 34.7 G/DL (ref 31.5–36.5)
MCV RBC AUTO: 99 FL (ref 78–100)
MONOCYTES # BLD AUTO: 0.4 10E9/L (ref 0–1.3)
MONOCYTES NFR BLD AUTO: 7 %
NEUTROPHILS # BLD AUTO: 3 10E9/L (ref 1.6–8.3)
NEUTROPHILS NFR BLD AUTO: 52.8 %
NRBC # BLD AUTO: 0 10*3/UL
NRBC BLD AUTO-RTO: 0 /100
O2/TOTAL GAS SETTING VFR VENT: ABNORMAL %
OXYHGB MFR BLDV: 88 %
PCO2 BLDV: 41 MM HG (ref 40–50)
PH BLDV: 7.41 PH (ref 7.32–7.43)
PLATELET # BLD AUTO: 183 10E9/L (ref 150–450)
PO2 BLDV: 63 MM HG (ref 25–47)
POTASSIUM SERPL-SCNC: 3.9 MMOL/L (ref 3.4–5.3)
PROT SERPL-MCNC: 7.5 G/DL (ref 6.8–8.8)
RBC # BLD AUTO: 4.23 10E12/L (ref 4.4–5.9)
SODIUM SERPL-SCNC: 139 MMOL/L (ref 133–144)
WBC # BLD AUTO: 5.6 10E9/L (ref 4–11)

## 2021-04-07 PROCEDURE — 70553 MRI BRAIN STEM W/O & W/DYE: CPT

## 2021-04-07 PROCEDURE — 99285 EMERGENCY DEPT VISIT HI MDM: CPT | Mod: 25

## 2021-04-07 PROCEDURE — 82140 ASSAY OF AMMONIA: CPT | Performed by: EMERGENCY MEDICINE

## 2021-04-07 PROCEDURE — 80053 COMPREHEN METABOLIC PANEL: CPT | Performed by: EMERGENCY MEDICINE

## 2021-04-07 PROCEDURE — 93005 ELECTROCARDIOGRAM TRACING: CPT

## 2021-04-07 PROCEDURE — 96360 HYDRATION IV INFUSION INIT: CPT | Mod: 59

## 2021-04-07 PROCEDURE — A9585 GADOBUTROL INJECTION: HCPCS | Performed by: EMERGENCY MEDICINE

## 2021-04-07 PROCEDURE — 258N000003 HC RX IP 258 OP 636: Performed by: EMERGENCY MEDICINE

## 2021-04-07 PROCEDURE — 82077 ASSAY SPEC XCP UR&BREATH IA: CPT | Performed by: EMERGENCY MEDICINE

## 2021-04-07 PROCEDURE — 999N001017 HC STATISTIC GLUCOSE BY METER IP

## 2021-04-07 PROCEDURE — 82805 BLOOD GASES W/O2 SATURATION: CPT | Performed by: EMERGENCY MEDICINE

## 2021-04-07 PROCEDURE — 85025 COMPLETE CBC W/AUTO DIFF WBC: CPT | Performed by: EMERGENCY MEDICINE

## 2021-04-07 PROCEDURE — 96361 HYDRATE IV INFUSION ADD-ON: CPT

## 2021-04-07 PROCEDURE — 83735 ASSAY OF MAGNESIUM: CPT | Performed by: EMERGENCY MEDICINE

## 2021-04-07 PROCEDURE — 255N000002 HC RX 255 OP 636: Performed by: EMERGENCY MEDICINE

## 2021-04-07 RX ORDER — GADOBUTROL 604.72 MG/ML
7.5 INJECTION INTRAVENOUS ONCE
Status: COMPLETED | OUTPATIENT
Start: 2021-04-07 | End: 2021-04-07

## 2021-04-07 RX ADMIN — GADOBUTROL 7.5 ML: 604.72 INJECTION INTRAVENOUS at 17:18

## 2021-04-07 RX ADMIN — SODIUM CHLORIDE, POTASSIUM CHLORIDE, SODIUM LACTATE AND CALCIUM CHLORIDE 1000 ML: 600; 310; 30; 20 INJECTION, SOLUTION INTRAVENOUS at 16:16

## 2021-04-07 ASSESSMENT — MIFFLIN-ST. JEOR: SCORE: 1738.65

## 2021-04-07 ASSESSMENT — ENCOUNTER SYMPTOMS
BLOOD IN STOOL: 0
FEVER: 0
WEAKNESS: 0
DIARRHEA: 0
BACK PAIN: 1
VOMITING: 0

## 2021-04-07 NOTE — ED PROVIDER NOTES
"  History   Chief Complaint:  Back Pain       The history is provided by the patient and the spouse.      Stephane Vegrara is a 36 year old male with history of hypertension who presents with back pain. He mentions ongoing back pain for 10 years. He has been taking gabapentin for about a week now for back pain, but today pain is more severe. He has not scheduled a back procedure because he wants to remain nicotine free. Dr. Phillip Nayak of [facility] will be his surgeon. Notes associated visual disturbances such as blurred vision in both eyes, memory loss, and confusion. He can remember his kids' name, but he is unaware of his confusion. Denies one sided weakness, fever, vomiting , diarrhea, and bloody stool. Denies being on new medications.    Per his wife, he has been \"out of it\" with associated confusion and memory loss. Often he will repeat what he just said 10 minutes ago. She notes slurred speech. Additionally,  last week he stopped vaping and experienced vomiting and tremors.     Review of Systems   Constitutional: Negative for fever.   Eyes: Positive for visual disturbance.   Gastrointestinal: Negative for blood in stool, diarrhea and vomiting.   Musculoskeletal: Positive for back pain.   Neurological: Negative for weakness.   All other systems reviewed and are negative.      Allergies:  No Known Drug Allergies    Medications:  allopurinol  hydrochlorothiazide   lisinopril   Sertraline  gabapentin     Past Medical History:    Hypertension  ETOH abuse   Generalized anxiety disorder    Gout of left foot   Cellulitis     Past Surgical History:    ORIF foot  Inguinal hernia repair     Family History:    Mother: hypertension   Father: gout     Social History:   Drinks about 2 glasses of alcohol 3-4 days a week .   Takes CBD once in while  Denies illegal drug use.   Recently stopped vaping.     Physical Exam     Patient Vitals for the past 24 hrs:   BP Temp Temp src Pulse Resp SpO2 Height Weight " "  04/07/21 1735 (!) 146/116 -- -- 89 -- 92 % -- --   04/07/21 1630 (!) 131/96 -- -- 87 -- 94 % -- --   04/07/21 1622 -- 98.4  F (36.9  C) Oral -- -- -- -- --   04/07/21 1617 -- -- -- -- -- -- 1.727 m (5' 8\") 83.9 kg (185 lb)   04/07/21 1603 -- -- -- -- -- 94 % -- --   04/07/21 1602 (!) 125/96 -- -- 99 -- -- -- --   04/07/21 1451 (!) 136/94 -- Temporal 106 16 94 % -- --       Physical Exam    HEENT:    Oropharynx is moist, without lesions or trismus.  Eyes:    Conjunctiva normal  Neck:    Supple, no meningismus.     CV:     Regular rate and rhythm.      No murmurs, rubs or gallops.       No unilateral leg swelling.       2+ radial pulses bilateral.       No lower extremity edema.  PULM:    Clear to auscultation bilateral.       No respiratory distress.      Good air exchange.     No rales or wheezing.     No stridor.  ABD:    Soft, non-tender, non-distended.       No pulsatile masses.       No rebound, guarding or rigidity.  MSK:     No gross deformity to all four extremities.   LYMPH:   No cervical lymphadenopathy.  NEURO:   Alert & O x 3.      CN II-XII intact, speech is clear with no aphasia.       Finger to nose within normal limits.  No pronator drift.       Strength is 5/5 in all 4 extremities.  Sensation is intact.       Normal muscular tone, no tremor.     Good muscle tone, no atrophy.  Skin:    Warm, dry and intact.    Psych:    Mood is good and affect is appropriate.        Emergency Department Course     ECG:  ECG taken at 1637, ECG read at 1648  Normal sinus rhythm  Normal ECG  No significant change when compared to EKG dated 10/18/2020.   Rate 84 bpm. VT interval 162 ms. QRS duration 102 ms. QT/QTc 380/449 ms. P-R-T axes 55 24 26.     Imaging:  MR Brain w/o & w Contrast  IMPRESSION:  1.  Normal head MRI.  Reading per radiology    Laboratory:  CBC: WBC 5.6, HGB 14.5,    CMP: ALT  146 (H),  (H), Creatinine 0.78 o/w WNL  Glucose by meter(1612): 91  blood gas venous and oxyhgb: pH: 7.41, PCO2: " 41, PO2: 63 (H) , Bicarbonate: 26, Oxyhgb: 88, FIO2 RA, base excess 1.4      Alcohol level blood : 0.40 (HH)   Ammonia (on ice): 35  Magnesium: 2.5 (H)     Emergency Department Course:    Reviewed:  I reviewed nursing notes, vitals, past medical history and care everywhere    Assessments:  1545 I obtained history and examined the patient as noted above.   1755 I rechecked the patient and explained findings.     Interventions:  1616 lactated ringers BOLUS 1,000 mL IV   1718  Gadavist 7.5mL IV     Disposition:  The patient was discharged to home.     Impression & Plan         Medical Decision Makin-year-old male presented to the ED with reports of altered mental status.  Patient was alert and oriented at time of our evaluation with no focal neurological deficit.  MRI undertaken revealing no acute intracranial pathology including stroke, mass, hemorrhage or demyelinating disease.  He has a history of alcohol abuse but had adamantly denied any alcohol use today.  Today his alcohol level is 0.4.  Despite a blunt conversation that patient was clearly drinking, he denies that he was drinking today.  He clearly is in denial about his alcohol use.  This is a clear source for the patient's confusion today.  I offered him transfer to detox center for which he declines.  Despite the markedly elevated alcohol level, patient has no overt signs of alcohol intoxication speaking to his level of tolerance.  Alcohol cessation counseling provided.  Patient safe for discharge home under the care of his for another.    Diagnosis:    ICD-10-CM    1. Acute alcoholic intoxication with complication (H)  F10.929        Discharge Medications:  New Prescriptions    No medications on file       Scribe Disclosure:  I, Jarret Sanches, am serving as a scribe at 3:44 PM on 2021 to document services personally performed by Justin Lundy MD based on my observations and the provider's statements to me.        Justin Lundy  MD  04/10/21 0903

## 2021-04-07 NOTE — DISCHARGE INSTRUCTIONS
Please make an appointment to follow up with your primary care provider in 3-5 days even if entirely better.

## 2021-04-07 NOTE — ED TRIAGE NOTES
A&O x4.  ABC's intact.      Pt arrives with c/o memory loss, fatigue, not sleeping well since been on gabapentin x 2 wks.

## 2021-04-08 LAB — INTERPRETATION ECG - MUSE: NORMAL

## 2021-04-22 ENCOUNTER — MEDICAL CORRESPONDENCE (OUTPATIENT)
Dept: HEALTH INFORMATION MANAGEMENT | Facility: CLINIC | Age: 36
End: 2021-04-22

## 2021-04-22 DIAGNOSIS — M54.50 LUMBAGO: Primary | ICD-10-CM

## 2021-07-10 DIAGNOSIS — M10.9 GOUT OF LEFT FOOT, UNSPECIFIED CAUSE, UNSPECIFIED CHRONICITY: ICD-10-CM

## 2021-07-10 DIAGNOSIS — I10 BENIGN ESSENTIAL HTN: ICD-10-CM

## 2021-07-12 RX ORDER — ALLOPURINOL 100 MG/1
TABLET ORAL
Qty: 90 TABLET | Refills: 1 | COMMUNITY
Start: 2021-07-12

## 2021-07-12 RX ORDER — HYDROCHLOROTHIAZIDE 25 MG/1
TABLET ORAL
Qty: 90 TABLET | Refills: 1 | COMMUNITY
Start: 2021-07-12

## 2021-07-12 NOTE — TELEPHONE ENCOUNTER
Received incoming refill request for  Pending Prescriptions:                       Disp   Refills    allopurinol (ZYLOPRIM) 100 MG tablet [Pha*90 tab*1            Sig: TAKE 1 TABLET BY MOUTH EVERY DAY    hydrochlorothiazide (HYDRODIURIL) 25 MG t*90 tab*1            Sig: TAKE 1 TABLET BY MOUTH EVERY DAY    Patient last had a refill of this medication on 1/6/21, they are now due to be seen for an OV.

## 2023-04-03 NOTE — PHARMACY-ADMISSION MEDICATION HISTORY
Admission medication history interview status for this patient is complete. See [facility] admission navigator for allergy information, prior to admission medications and immunization status.     Medication history interview source(s):Patient  Medication history resources (including written lists, pill bottles, clinic record):[facility] list  Primary pharmacy:[facility], [place]    Changes made to PTA medication list:  Added: -  Deleted: -  Changed: -    Actions taken by pharmacist (provider contacted, etc):None     Additional medication history information:Pt was switched from lisinopril 40mg daily to zestoretic 20/12.5 daily a few months ago. Pt stopped taking zestoretic a few months ago due to dizziness and cough.    Medication reconciliation/reorder completed by provider prior to medication history? No    Do you take OTC medications (eg tylenol, ibuprofen, fish oil, eye/ear drops, etc)? N(Y/N)    For patients on insulin therapy: N (Y/N)      Prior to Admission medications    Medication Sig Last Dose Taking? Auth Provider   lisinopril-hydrochlorothiazide (PRINZIDE/ZESTORETIC) 20-12.5 MG tablet Take 2 tablets by mouth daily MUST SEE PROVIDER ASAP More than a month at Unknown time  Nery Lyon APRN CNP           
Initial (On Arrival)

## 2024-06-17 PROBLEM — Z76.89 HEALTH CARE HOME: Status: RESOLVED | Noted: 2020-02-12 | Resolved: 2024-06-17
